# Patient Record
Sex: FEMALE | Race: WHITE | NOT HISPANIC OR LATINO | Employment: FULL TIME | URBAN - NONMETROPOLITAN AREA
[De-identification: names, ages, dates, MRNs, and addresses within clinical notes are randomized per-mention and may not be internally consistent; named-entity substitution may affect disease eponyms.]

---

## 2017-07-24 ENCOUNTER — OFFICE VISIT (OUTPATIENT)
Dept: MEDICAL GROUP | Facility: CLINIC | Age: 32
End: 2017-07-24
Payer: MEDICAID

## 2017-07-24 VITALS
HEIGHT: 62 IN | SYSTOLIC BLOOD PRESSURE: 114 MMHG | HEART RATE: 74 BPM | TEMPERATURE: 97.8 F | DIASTOLIC BLOOD PRESSURE: 70 MMHG | OXYGEN SATURATION: 99 % | BODY MASS INDEX: 21.35 KG/M2 | WEIGHT: 116 LBS

## 2017-07-24 DIAGNOSIS — Z23 NEED FOR VACCINATION: ICD-10-CM

## 2017-07-24 DIAGNOSIS — E55.9 VITAMIN D DEFICIENCY: ICD-10-CM

## 2017-07-24 DIAGNOSIS — Z83.49 FAMILY HISTORY OF HYPOTHYROIDISM: ICD-10-CM

## 2017-07-24 DIAGNOSIS — Z20.5 EXPOSURE TO HEPATITIS C: ICD-10-CM

## 2017-07-24 DIAGNOSIS — G89.29 CHRONIC BILATERAL LOW BACK PAIN WITH BILATERAL SCIATICA: ICD-10-CM

## 2017-07-24 DIAGNOSIS — M54.41 CHRONIC BILATERAL LOW BACK PAIN WITH BILATERAL SCIATICA: ICD-10-CM

## 2017-07-24 DIAGNOSIS — F31.9 BIPOLAR 1 DISORDER (HCC): ICD-10-CM

## 2017-07-24 DIAGNOSIS — M54.42 CHRONIC BILATERAL LOW BACK PAIN WITH BILATERAL SCIATICA: ICD-10-CM

## 2017-07-24 DIAGNOSIS — Z86.2 HISTORY OF POLYCYTHEMIA: ICD-10-CM

## 2017-07-24 PROCEDURE — 90715 TDAP VACCINE 7 YRS/> IM: CPT | Performed by: PHYSICIAN ASSISTANT

## 2017-07-24 PROCEDURE — 99213 OFFICE O/P EST LOW 20 MIN: CPT | Mod: 25 | Performed by: PHYSICIAN ASSISTANT

## 2017-07-24 PROCEDURE — 90471 IMMUNIZATION ADMIN: CPT | Performed by: PHYSICIAN ASSISTANT

## 2017-07-24 RX ORDER — GABAPENTIN 300 MG/1
300 CAPSULE ORAL 3 TIMES DAILY
Qty: 90 CAP | Refills: 3 | Status: SHIPPED | OUTPATIENT
Start: 2017-07-24 | End: 2019-01-29 | Stop reason: SDUPTHER
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
## 2017-07-24 NOTE — MR AVS SNAPSHOT
Conchis Winter   2017 2:40 PM   Office Visit   MRN: [id number]    [facility] Phone[facility]  [phone]    Description:  Female : 1985   Provider:  Brenda Barfield PA-C           Allergies as of 2017     No Known Allergies      You were diagnosed with     Chronic bilateral low back pain with bilateral sciatica   [0244152]       Bipolar 1 disorder (CMS-HCC)   [223151]       History of polycythemia   [201030]       Family history of hypothyroidism   [190416]       Vitamin D deficiency   [8183015]       Exposure to hepatitis C   [337146]       Need for vaccination   [720756]         Vital Signs     Smoking Status                   Former Smoker           Basic Information     Date Of Birth Sex Race Ethnicity Preferred Language    1985 Female White Non- English      Your appointments     2017  2:00 PM   ANNUAL EXAM PREVENTATIVE with Brenda Barfield PA-C   [facility] (--)    [address] [place] [id number] [phone]              Problem List              ICD-10-CM Priority Class Noted - Resolved    Chronic back pain M54.9, G89.29   3/5/2015 - Present    Anxiety F41.9   3/5/2015 - Present    Exposure to hepatitis C Z20.5   3/5/2015 - Present    Bipolar 1 disorder (CMS-HCC) F31.9   2017 - Present    History of polycythemia Z86.2   2017 - Present    Family history of hypothyroidism Z83.49   2017 - Present    Vitamin D deficiency E55.9   2017 - Present      Health Maintenance        Date Due Completion Dates    IMM DTaP/Tdap/Td Vaccine (1 - Tdap) 2004 ---    PAP SMEAR 2006 ---    IMM INFLUENZA (1) 2017 ---            Current Immunizations     Tdap Vaccine  Incomplete      Below and/or attached are the medications your provider expects you to take. Review all of your home medications and newly ordered medications with your provider and/or pharmacist. Follow medication instructions  as directed by your provider and/or pharmacist. Please keep your medication list with you and share with your provider. Update the information when medications are discontinued, doses are changed, or new medications (including over-the-counter products) are added; and carry medication information at all times in the event of emergency situations     Allergies:  No Known Allergies          Medications  Valid as of: July 24, 2017 -  3:27 PM    Generic Name Brand Name Tablet Size Instructions for use    DiazePAM (Tab) VALIUM 10 MG Take 10 mg by mouth every 6 hours as needed.        Gabapentin (Cap) NEURONTIN 300 MG Take 1 Cap by mouth 3 times a day.        Oxycodone-Acetaminophen (Tab) PERCOCET-10  MG Take 1-2 Tabs by mouth every four hours as needed.        .                 Medicines prescribed today were sent to:     [id number] [address] [address]    Phone: [phone] Fax: [phone]    Open 24 Hours?: No      Medication refill instructions:       If your prescription bottle indicates you have medication refills left, it is not necessary to call your provider’s office. Please contact your pharmacy and they will refill your medication.    If your prescription bottle indicates you do not have any refills left, you may request refills at any time through one of the following ways: The online [employer] system (except Urgent Care), by calling your provider’s office, or by asking your pharmacy to contact your provider’s office with a refill request. Medication refills are processed only during regular business hours and may not be available until the next business day. Your provider may request additional information or to have a follow-up visit with you prior to refilling your medication.   *Please Note: Medication refills are assigned a new Rx number when refilled electronically. Your pharmacy may indicate that no refills were  authorized even though a new prescription for the same medication is available at the pharmacy. Please request the medicine by name with the pharmacy before contacting your provider for a refill.        Your To Do List     Future Labs/Procedures Complete By Expires    BASIC METABOLIC PANEL  As directed 7/24/2018    CBC WITHOUT DIFFERENTIAL  As directed 1/24/2018    DX-LUMBAR SPINE-2 OR 3 VIEWS  As directed 7/24/2018    DX-LUMBAR SPINE-BEND OR FLEX-EXT  As directed 7/24/2018      Referral     A referral request has been sent to our patient care coordination department. Please allow 3-5 business days for us to process this request and contact you either by phone or mail. If you do not hear from us by the 5th business day, please call us at [phone].           Zang Access Code: [email or web address]  Expires: 8/23/2017  3:27 PM    Your email address is not on file at [facility].  Email Addresses are required for you to sign up for Zang, please contact [phone] to verify your personal information and to provide your email address prior to attempting to register for Zang.    Conchis Gomes  [address] [place] [address]    Zang  A secure, online tool to manage your health information     [facility]’s Zang® is a secure, online tool that connects you to your personalized health information from the privacy of your home -- day or night - making it very easy for you to manage your healthcare. Once the activation process is completed, you can even access your medical information using the Zang joel, which is available for free in the Apple Joel store or Google Play store.     To learn more about Zang, visit [email or web address]    There are two levels of access available (as shown below):   My Chart Features  [facility]  Specialists [facility]  Urgent  Care Non-[facility] Doctor   Email your healthcare team securely and privately 24/7 X X X    Manage  appointments: schedule your next appointment; view details of past/upcoming appointments X      Request prescription refills. X      View recent personal medical records, including lab and immunizations X X X X   View health record, including health history, allergies, medications X X X X   Read reports about your outpatient visits, procedures, consult and ER notes X X X X   See your discharge summary, which is a recap of your hospital and/or ER visit that includes your diagnosis, lab results, and care plan X X  X     How to register for Philo:  Once your e-mail address has been verified, follow the following steps to sign up for Philo.     1. Go to  [email or web address]  2. Click on the Sign Up Now box, which takes you to the New Member Sign Up page. You will need to provide the following information:  a. Enter your Philo Access Code exactly as it appears at the top of this page. (You will not need to use this code after you’ve completed the sign-up process. If you do not sign up before the expiration date, you must request a new code.)   b. Enter your date of birth.   c. Enter your home email address.   d. Click Submit, and follow the next screen’s instructions.  3. Create a Philo ID. This will be your Philo login ID and cannot be changed, so think of one that is secure and easy to remember.  4. Create a Philo password. You can change your password at any time.  5. Enter your Password Reset Question and Answer. This can be used at a later time if you forget your password.   6. Enter your e-mail address. This allows you to receive e-mail notifications when new information is available in Philo.  7. Click Sign Up. You can now view your health information.    For assistance activating your Philo account, call [phone]

## 2017-07-24 NOTE — ASSESSMENT & PLAN NOTE
Patient has tested with low vitamin D in the past and would like to retest today. She does not currently take supplements although she had done so in the past

## 2017-07-24 NOTE — ASSESSMENT & PLAN NOTE
"Patient had been told that she had excessive red blood cells at one point in time and so phlebotomy was her least expensive and most helpful option. She states that when she gave blood she felt very lethargic for approximately 3 days afterwards. She states she can \"smell the iron in her hair and mucus\"and believes her iron levels are very high at this time.  "

## 2017-07-24 NOTE — ASSESSMENT & PLAN NOTE
Patient tested negative approximately 2 years ago however she had a less than significant reading on hepatitis C so would like a retest panel because she is relatively sure she was exposed at one point in time.

## 2017-07-24 NOTE — ASSESSMENT & PLAN NOTE
Patient's mother is hypothyroid as well as her grandmother. Patient believes she may develop this at one point in time and would like to monitor this via Blood work.

## 2017-07-24 NOTE — ASSESSMENT & PLAN NOTE
Patient states she was evaluated by psychiatry and told she had bipolar disorder. She needs a follow-up visit with a new psychiatrist however in the meantime she is well controlled on over-the-counter lithium supplements at this time.

## 2017-07-27 ENCOUNTER — OFFICE VISIT (OUTPATIENT)
Dept: MEDICAL GROUP | Facility: CLINIC | Age: 32
End: 2017-07-27
Payer: MEDICAID

## 2017-07-27 ENCOUNTER — HOSPITAL ENCOUNTER (OUTPATIENT)
Facility: MEDICAL CENTER | Age: 32
End: 2017-07-27
Attending: PHYSICIAN ASSISTANT
Payer: MEDICAID

## 2017-07-27 VITALS
HEIGHT: 61 IN | WEIGHT: 118 LBS | SYSTOLIC BLOOD PRESSURE: 118 MMHG | DIASTOLIC BLOOD PRESSURE: 70 MMHG | BODY MASS INDEX: 22.28 KG/M2 | RESPIRATION RATE: 14 BRPM | TEMPERATURE: 97.5 F | HEART RATE: 78 BPM | OXYGEN SATURATION: 96 %

## 2017-07-27 DIAGNOSIS — Z01.419 WELL FEMALE EXAM WITH ROUTINE GYNECOLOGICAL EXAM: ICD-10-CM

## 2017-07-27 PROCEDURE — 99395 PREV VISIT EST AGE 18-39: CPT | Mod: EP | Performed by: PHYSICIAN ASSISTANT

## 2017-07-27 PROCEDURE — 87491 CHLMYD TRACH DNA AMP PROBE: CPT

## 2017-07-27 PROCEDURE — 87591 N.GONORRHOEAE DNA AMP PROB: CPT

## 2017-07-27 PROCEDURE — 88175 CYTOPATH C/V AUTO FLUID REDO: CPT

## 2017-07-27 PROCEDURE — 87624 HPV HI-RISK TYP POOLED RSLT: CPT

## 2017-07-27 PROCEDURE — 99000 SPECIMEN HANDLING OFFICE-LAB: CPT | Performed by: PHYSICIAN ASSISTANT

## 2017-07-27 ASSESSMENT — PATIENT HEALTH QUESTIONNAIRE - PHQ9
SUM OF ALL RESPONSES TO PHQ QUESTIONS 1-9: 20
CLINICAL INTERPRETATION OF PHQ2 SCORE: 3
5. POOR APPETITE OR OVEREATING: 3 - NEARLY EVERY DAY

## 2017-07-27 NOTE — PROGRESS NOTES
SUBJECTIVE: 31 y.o. female for routine pap and checkup.  Chief Complaint   Patient presents with   • Gynecologic Exam         Last Pap:   Contraception: IUD, paragard  H/O Abnormal Pap yes, positive HPV  H/O STI yes, chlamydia, HPV   Current partner: male mongomous     LMP: 2017    Allergies: Review of patient's allergies indicates no known allergies.     ROS:  Menses every month with no cramping  Bleeding is moderate.    excedrin and pamprin analgesics required during menses.  No menstrual depression, labile mood, anxiety, bloating/fluid retention, insomnia. Positive for migraine headaches, libido changes   no menopause symptoms of hot flashes, night sweats, sleep disruption, mood changes, vaginal dryness.   No pelvic pain, or dyspareunia. No unusual vaginal discharge   No breast tenderness, mass, nipple discharge, changes in size or contour, or abnormal cyclic discomfort.  No urinary tract symptoms, no incontinence.   No abdominal pain, change in bowel habits, black or bloody stools.    No unusual headaches, no visual changes.   No prolonged cough. No dyspnea or chest pain on exertion.    No skin lesions, concerns.     Preventive Care:  Mammogram not due.    DEXA not due  Colonoscopy not due  HPV vaccine didn't receive and not eligible.     Current Outpatient Prescriptions   Medication Sig Dispense Refill   • gabapentin (NEURONTIN) 300 MG Cap Take 1 Cap by mouth 3 times a day. 90 Cap 3   • diazepam (VALIUM) 10 MG tablet Take 10 mg by mouth every 6 hours as needed.     • Oxycodone-Acetaminophen (PERCOCET-10)  MG TABS Take 1-2 Tabs by mouth every four hours as needed.       No current facility-administered medications for this visit.     She  has a past medical history of Chronic back pain (3/5/2015); Anxiety (3/5/2015); and Hyperlipidemia.  She  has no past surgical history on file.     Family History:   Family History   Problem Relation Age of Onset   • Heart Attack Paternal Grandfather    •  "Heart Attack Paternal Grandmother    • Thyroid Mother    • Diabetes Paternal Grandmother        Family History negative for : Breast, Colon, Lung, or female organ cancer,  osteoporosis.     OBJECTIVE:   /70 mmHg  Pulse 78  Temp(Src) 36.4 °C (97.5 °F)  Resp 14  Ht 1.556 m (5' 1.25\")  Wt 53.524 kg (118 lb)  BMI 22.11 kg/m2  SpO2 96%  Body mass index is 22.11 kg/(m^2).      HEAD AND NECK:  Ears normal.  Throat, oral cavity and tongue normal.  Neck supple. No adenopathy or masses in the neck or supraclavicular regions.  No carotid bruits. No thyromegaly.   NEURO: Cranial nerves are normal. DTR's normal and symmetric.    CHEST:  Clear, good air entry, no wheezes or rales.   HEART:  Regular rate and rhythm.  S1 and S2 normal.  No edema or JVD. ABDOMEN:  Soft without tenderness, guarding, mass or organomegaly.  No CVA tenderness or inguinal adenopathy.   EXTREMITIES:  Extremities, reflexes and peripheral pulses are normal.    SKIN:  No rashes or suspicious skin lesions noted.     Breast Exam: Performed with instruction during examination. No axillary lymphadenopathy. No fixed or dominant masses. No nipple retraction or skin abnormalities.    PELVIC EXAMINATION    Chaperone Cori Welsh MA    Labia: normal, no lesions or erythema noted   Urethral Orifice: normal  Vagina: vaginal canal clear, no lesions  Cervix: No polyps, masses or lesions noted. Normal, nonfoul discharge noted.     BIMANUAL EXAM   Vaginal Walls: normal  Cervix: No CMT  Uterus: normal  Adnexa: normal   Rectal: not performed      <ASSESSMENT>  1. Well female exam with routine gynecological exam  THINPREP PAP W/HPV AND CTNG       Discussed breast self exam, STD prevention, HIV risk factors and prevention, use and side effects of OCPs, use and side effects of HRT, family planning choices and adequate intake of calcium and vitamin D   Follow-up in 1 years for next Gyn exam and Pap if HPV is still positive.             "

## 2017-07-27 NOTE — MR AVS SNAPSHOT
"Conchis Winter   2017 8:00 AM   Office Visit   MRN: [id number]    [facility] Phone[facility]  [phone]    Description:  Female : 1985   Provider:  Brenda Barfield PA-C           Reason for Visit     Gynecologic Exam           Allergies as of 2017     No Known Allergies      You were diagnosed with     Well female exam with routine gynecological exam   [[id number]]         Vital Signs     Blood Pressure Pulse Temperature Respirations Height Weight    118/70 mmHg 78 36.4 °C (97.5 °F) 14 1.556 m (5' 1.25\") 53.524 kg (118 lb)    Body Mass Index Oxygen Saturation Smoking Status             22.11 kg/m2 96% Former Smoker         Basic Information     Date Of Birth Sex Race Ethnicity Preferred Language    1985 Female White Non- English      Your appointments     Aug 02, 2017 10:15 AM   New Patient with Maxx Shoemaker M.D.   [facility] PHYSIAT[facility] (--)    [address]  [facility] [id number] [phone]           Please bring Photo ID, Insurance Cards, All Medication Bottles and copies of any legal documents (such as Living Will, Power of ) If speaking a language besides English please bring an adult . Please arrive 30 minutes prior for check in and registration. You will be receiving a confirmation call a few days before your appointment from our automated call confirmation system.              Problem List              ICD-10-CM Priority Class Noted - Resolved    Chronic back pain M54.9, G89.29   3/5/2015 - Present    Anxiety F41.9   3/5/2015 - Present    Exposure to hepatitis C Z20.5   3/5/2015 - Present    Bipolar 1 disorder (CMS-HCC) F31.9   2017 - Present    History of polycythemia Z86.2   2017 - Present    Family history of hypothyroidism Z83.49   2017 - Present    Vitamin D deficiency E55.9   2017 - Present      Health Maintenance        Date Due Completion Dates    PAP SMEAR 2006 ---    IMM " INFLUENZA (1) 9/1/2017 ---    IMM DTaP/Tdap/Td Vaccine (2 - Td) 7/24/2027 7/24/2017            Current Immunizations     Tdap Vaccine 7/24/2017      Below and/or attached are the medications your provider expects you to take. Review all of your home medications and newly ordered medications with your provider and/or pharmacist. Follow medication instructions as directed by your provider and/or pharmacist. Please keep your medication list with you and share with your provider. Update the information when medications are discontinued, doses are changed, or new medications (including over-the-counter products) are added; and carry medication information at all times in the event of emergency situations     Allergies:  No Known Allergies          Medications  Valid as of: July 27, 2017 -  4:24 PM    Generic Name Brand Name Tablet Size Instructions for use    DiazePAM (Tab) VALIUM 10 MG Take 10 mg by mouth every 6 hours as needed.        Gabapentin (Cap) NEURONTIN 300 MG Take 1 Cap by mouth 3 times a day.        Oxycodone-Acetaminophen (Tab) PERCOCET-10  MG Take 1-2 Tabs by mouth every four hours as needed.        .                 Medicines prescribed today were sent to:     [facility] [address]    Phone: [phone] Fax: [phone]    Open 24 Hours?: No      Medication refill instructions:       If your prescription bottle indicates you have medication refills left, it is not necessary to call your provider’s office. Please contact your pharmacy and they will refill your medication.    If your prescription bottle indicates you do not have any refills left, you may request refills at any time through one of the following ways: The online [employer] system (except Urgent Care), by calling your provider’s office, or by asking your pharmacy to contact your provider’s office with a refill request. Medication refills are  processed only during regular business hours and may not be available until the next business day. Your provider may request additional information or to have a follow-up visit with you prior to refilling your medication.   *Please Note: Medication refills are assigned a new Rx number when refilled electronically. Your pharmacy may indicate that no refills were authorized even though a new prescription for the same medication is available at the pharmacy. Please request the medicine by name with the pharmacy before contacting your provider for a refill.        Your To Do List     Future Labs/Procedures Complete By Expires    THINPREP PAP W/HPV AND CTNG  As directed 7/27/2018         Recommendi Access Code: [email or web address]  Expires: 8/23/2017  3:27 PM    Your email address is not on file at [facility].  Email Addresses are required for you to sign up for Recommendi, please contact [phone] to verify your personal information and to provide your email address prior to attempting to register for Recommendi.    Conchis Gomes  [address] [place] [id number]    Recommendi  A secure, online tool to manage your health information     [facility]’s Recommendi® is a secure, online tool that connects you to your personalized health information from the privacy of your home -- day or night - making it very easy for you to manage your healthcare. Once the activation process is completed, you can even access your medical information using the Recommendi joel, which is available for free in the Apple Joel store or Google Play store.     To learn more about Recommendi, visit [email or web address]    There are two levels of access available (as shown below):   My Chart Features  [facility] Primary Care Doctor [facility]  Specialists [facility]  [facility]   Email your healthcare team securely and privately 24/7 X X X    Manage appointments: schedule your next appointment; view details of past/upcoming appointments  X      Request prescription refills. X      View recent personal medical records, including lab and immunizations X X X X   View health record, including health history, allergies, medications X X X X   Read reports about your outpatient visits, procedures, consult and ER notes X X X X   See your discharge summary, which is a recap of your hospital and/or ER visit that includes your diagnosis, lab results, and care plan X X  X     How to register for Publicfast:  Once your e-mail address has been verified, follow the following steps to sign up for Publicfast.     1. Go to  [email or web address]  2. Click on the Sign Up Now box, which takes you to the New Member Sign Up page. You will need to provide the following information:  a. Enter your Publicfast Access Code exactly as it appears at the top of this page. (You will not need to use this code after you’ve completed the sign-up process. If you do not sign up before the expiration date, you must request a new code.)   b. Enter your date of birth.   c. Enter your home email address.   d. Click Submit, and follow the next screen’s instructions.  3. Create a Publicfast ID. This will be your Publicfast login ID and cannot be changed, so think of one that is secure and easy to remember.  4. Create a Publicfast password. You can change your password at any time.  5. Enter your Password Reset Question and Answer. This can be used at a later time if you forget your password.   6. Enter your e-mail address. This allows you to receive e-mail notifications when new information is available in Publicfast.  7. Click Sign Up. You can now view your health information.    For assistance activating your Publicfast account, call [phone]

## 2017-07-29 LAB
C TRACH DNA GENITAL QL NAA+PROBE: NEGATIVE
CYTOLOGY REG CYTOL: NORMAL
HPV HR 12 DNA CVX QL NAA+PROBE: NEGATIVE
HPV16 DNA SPEC QL NAA+PROBE: NEGATIVE
HPV18 DNA SPEC QL NAA+PROBE: NEGATIVE
N GONORRHOEA DNA GENITAL QL NAA+PROBE: NEGATIVE
SPECIMEN SOURCE: NORMAL
SPECIMEN SOURCE: NORMAL

## 2017-08-02 ENCOUNTER — OFFICE VISIT (OUTPATIENT)
Dept: PHYSICAL MEDICINE AND REHAB | Facility: MEDICAL CENTER | Age: 32
End: 2017-08-02
Payer: MEDICAID

## 2017-08-02 VITALS
OXYGEN SATURATION: 96 % | SYSTOLIC BLOOD PRESSURE: 94 MMHG | HEART RATE: 77 BPM | WEIGHT: 120 LBS | HEIGHT: 62 IN | DIASTOLIC BLOOD PRESSURE: 64 MMHG | BODY MASS INDEX: 22.08 KG/M2 | TEMPERATURE: 98.2 F

## 2017-08-02 DIAGNOSIS — M25.559 ARTHRALGIA OF HIP, UNSPECIFIED LATERALITY: ICD-10-CM

## 2017-08-02 DIAGNOSIS — M54.2 NECK PAIN: ICD-10-CM

## 2017-08-02 DIAGNOSIS — M54.41 CHRONIC BILATERAL LOW BACK PAIN WITH BILATERAL SCIATICA: ICD-10-CM

## 2017-08-02 DIAGNOSIS — M54.42 CHRONIC BILATERAL LOW BACK PAIN WITH BILATERAL SCIATICA: ICD-10-CM

## 2017-08-02 DIAGNOSIS — G89.29 CHRONIC BILATERAL LOW BACK PAIN WITH BILATERAL SCIATICA: ICD-10-CM

## 2017-08-02 DIAGNOSIS — G56.03 BILATERAL CARPAL TUNNEL SYNDROME: ICD-10-CM

## 2017-08-02 DIAGNOSIS — M54.50 LUMBOSACRAL PAIN: ICD-10-CM

## 2017-08-02 DIAGNOSIS — M54.16 LUMBAR RADICULITIS: ICD-10-CM

## 2017-08-02 PROCEDURE — 99204 OFFICE O/P NEW MOD 45 MIN: CPT | Performed by: PHYSICAL MEDICINE & REHABILITATION

## 2017-08-02 ASSESSMENT — ENCOUNTER SYMPTOMS
SENSORY CHANGE: 1
MYALGIAS: 1
NAUSEA: 0
TINGLING: 1
DOUBLE VISION: 0
PALPITATIONS: 0
VOMITING: 0
NECK PAIN: 1
ORTHOPNEA: 0
PHOTOPHOBIA: 0
BACK PAIN: 1
FEVER: 0
HEMOPTYSIS: 0
SPUTUM PRODUCTION: 0
CHILLS: 0

## 2017-08-02 NOTE — PROGRESS NOTES
Subjective:      Conchis Gomes is a 31 y.o. female who presents with New Patient      Chief complaint: Low back pain        HPI   The patient notes long history of spinal/joints/musculoskeletal pain, denies specific trauma at onset, although notes began approximately 6 months after her child was born in 2009.    She notes ongoing pain in the lumbosacral region, also bilateral lower limb radiating pain with neuropathic component.    The patient notes bilateral hip pain.    The patient notes neck pain, without overt radicular component.    The patient notes wrist/hand pain, also carpal tunnel symptoms.    The patient notes prior trial with medications. She has been to physical therapy. She notes seen Dr. Haque in the past, tried injection, without benefit, and records not available for review. No bowel/bladder dysfunction noted. No overt limb weakness noted. She is making an effort with home exercise program. The ongoing pain limits her ability to function. She is inquiring about additional treatment options.      MEDICAL RECORDS REVIEW/DATA REVIEW: Reviewed in epic.    Records Reviewed: Reviewed referring provider notes.     I reviewed medications.     I reviewed  profile 8/2/2017.    I reviewed diagnostic studies:     I reviewed radiographs. No recent spine radiographs available for review.    I reviewed lab studies. No recent laboratory studies available for review.     I reviewed medical issues.     I reviewed family history: No neuromuscular disorders noted.    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      PAST MEDICAL HISTORY:   Past Medical History   Diagnosis Date   • Chronic back pain 3/5/2015   • Anxiety 3/5/2015   • Hyperlipidemia      no medication       PAST SURGICAL HISTORY:  History reviewed. No pertinent past surgical history.    ALLERGIES:  Review of patient's allergies indicates no known allergies.    MEDICATIONS:    Outpatient Encounter Prescriptions as of 8/2/2017   Medication Sig Dispense  Refill   • gabapentin (NEURONTIN) 300 MG Cap Take 1 Cap by mouth 3 times a day. 90 Cap 3   • [DISCONTINUED] diazepam (VALIUM) 10 MG tablet Take 10 mg by mouth every 6 hours as needed.     • [DISCONTINUED] Oxycodone-Acetaminophen (PERCOCET-10)  MG TABS Take 1-2 Tabs by mouth every four hours as needed.       No facility-administered encounter medications on file as of 8/2/2017.       SOCIAL HISTORY:    Social History     Social History   • Marital Status: Single     Spouse Name: N/A   • Number of Children: N/A   • Years of Education: N/A     Social History Main Topics   • Smoking status: Former Smoker -- 0.50 packs/day for .5 years     Quit date: 08/27/2016   • Smokeless tobacco: Former User     Quit date: 08/27/2016   • Alcohol Use: No   • Drug Use: Yes     Special: Marijuana      Comment: edibles, sometimes smoke    • Sexual Activity: No     Other Topics Concern   •  Service No   • Blood Transfusions No   • Caffeine Concern No   • Occupational Exposure No   • Hobby Hazards No   • Sleep Concern No   • Stress Concern Yes   • Weight Concern No   • Special Diet No   • Back Care Yes   • Exercise Yes   • Bike Helmet Yes   • Seat Belt Yes   • Self-Exams Yes     Social History Narrative         Review of Systems   Constitutional: Negative for fever and chills.   HENT: Negative for hearing loss and tinnitus.    Eyes: Negative for double vision and photophobia.   Respiratory: Negative for hemoptysis and sputum production.    Cardiovascular: Negative for palpitations and orthopnea.   Gastrointestinal: Negative for nausea and vomiting.   Genitourinary: Negative for urgency and frequency.   Musculoskeletal: Positive for myalgias, back pain, joint pain and neck pain.   Skin: Negative.    Neurological: Positive for tingling and sensory change.   Endo/Heme/Allergies: Negative.    All other systems reviewed and are negative.        Objective:     BP 94/64 mmHg  Pulse 77  Temp(Src) 36.8 °C (98.2 °F)  Ht 1.575 m (5'  "2\")  Wt 54.432 kg (120 lb)  BMI 21.94 kg/m2  SpO2 96%  LMP 07/07/2017     Physical Exam  Constitutional: oriented to person, place, and time, appears well-developed and well-nourished.   HEENT: Normocephalic atraumatic, neck supple, no JVD noted, no masses noted, no meningeal signs noted  Lymphadenopathy: no cervical, supraclavicular, or inguinal lymphadenopathy noted  Cardiovascular: Intact distal pulses, including at wrists and ankles, no limb swelling noted  Pulmonary: No tachypnea noted, no accessory muscle use noted, no dyspnea noted  Abdominal: Soft, nontender, exhibits no distension, no peritoneal signs, no HSM  Musculoskeletal:   Right shoulder: exhibits mild tenderness. Minimal pain with range of motion testing  Left shoulder: exhibits mild tenderness. Minimal pain with range of motion testing  Right hip: exhibits mild tenderness. Minimal pain with range of motion testing  Left hip: exhibits mild tenderness. Minimal pain with range of motion testing  Cervical back: exhibits mild decreased range of motion, mild tenderness and mild pain. Spurling's testing produces mild axial pain, trigger points noted  Lumbar back: exhibits decreased range of motion, tenderness and mild pain. straight leg testing produces posterior pelvic pain, trigger points noted  Wrist/hand: mild pain with range of motion testing, equivocal tinel's at wrist, negative tinel's at elbows  Neurological: oriented to person, place, and time. Cranial nerves grossly intact, normal strength. Sensation intact distally. Reflexes 1+ in upper and lower limbs, Gait mildly antalgic, reciprocal, Able to heel/toe walk, No upper motor neuron signs evident  Skin: Skin is intact. no rashes or lesions noted  Psychiatric: normal mood and affect. speech is normal and behavior is normal. Judgment and thought content normal. Cognition and memory are normal.        Assessment/Plan:       ASSESSMENT:    1. Lumbosacral pain, myofascial pain, lumbar radiculitis, " concern for stenosis    - obtain lumbar spine x-rays, ordered by PCP  - MR-LUMBAR SPINE-W/O; Future    2. Bilateral hip pain, sprain strain     - DX-HIP-BILATERAL-WITH PELVIS-2 VIEWS; Future    3. Neck pain, myofascial pain    - DX-CERVICAL SPINE-4+ VIEWS; Future    4. Bilateral wrist/hand pain, sprain strain, carpal tunnel symptoms    - WRIST SPLINTS, as trial  - reviewed ergonomic modifications    5. Medication monitoring    - REFERRAL TO PAIN CLINIC, as patient advised at this facility complies with the federal illegal substance last  - The patient was advised that this facility is available for further services that do not involve controlled substances    6. Co-morbid medical issues with care per primary care provider    - Obtain laboratory studies, ordered by primary care provider      DISCUSSION/PLAN:    - I discussed management options. I reviewed symptomatic care    - I would like to obtain/review additional records    - I reviewed home exercise program, with medical precautions    - The patient can consider complementary trials with     - I reviewed medication monitoring.  I reviewed further symptomatic medications. I did not prescribe any medication today    - I reviewed additional diagnostic options, including further/advanced imaging, electrodiagnostic testing, vascular studies, and further lab screen    - I reviewed additional therapeutic options, including injection/interventional therapy and additional consultative input    - I reviewed psychosocial interventions    - Return after the above noted diagnostic studies or an as-needed basis      Please note that this dictation was created using voice recognition software. I have made every reasonable attempt to correct obvious errors but there may be errors of grammar and content that I may have overlooked prior to finalization of this note.

## 2017-08-04 ENCOUNTER — HOSPITAL ENCOUNTER (OUTPATIENT)
Dept: RADIOLOGY | Facility: MEDICAL CENTER | Age: 32
End: 2017-08-04

## 2017-08-07 ENCOUNTER — TELEPHONE (OUTPATIENT)
Dept: MEDICAL GROUP | Facility: CLINIC | Age: 32
End: 2017-08-07

## 2017-08-07 NOTE — TELEPHONE ENCOUNTER
1. Caller Name: Conchis                      Call Back Number: [phone] (home)       2. Message: Conchis called and stated the the referral you put in a while ago was just to a therapist and she needs someone who can write her a prescription and see her for her bi-polar    3. Patient approves office to leave a detailed voicemail/MyChart message: N\A

## 2017-08-09 NOTE — TELEPHONE ENCOUNTER
This is incorrect. She was referred to psychiatry. Psychiatry prescribes medication. She should call the [facility] referral department to follow up with this or, if she know who she wants to see she can come in to have a printout of her psychiatry referral.       Patient's phone is not working. Spoke with father and told him to have Conchis call the [place] office when she could.

## 2017-09-01 ENCOUNTER — NON-PROVIDER VISIT (OUTPATIENT)
Dept: MEDICAL GROUP | Facility: CLINIC | Age: 32
End: 2017-09-01
Payer: MEDICAID

## 2017-09-01 ENCOUNTER — HOSPITAL ENCOUNTER (OUTPATIENT)
Facility: MEDICAL CENTER | Age: 32
End: 2017-09-01
Attending: PHYSICIAN ASSISTANT
Payer: MEDICAID

## 2017-09-01 DIAGNOSIS — M54.41 CHRONIC BILATERAL LOW BACK PAIN WITH BILATERAL SCIATICA: ICD-10-CM

## 2017-09-01 DIAGNOSIS — Z01.89 ROUTINE LAB DRAW: ICD-10-CM

## 2017-09-01 DIAGNOSIS — M54.42 CHRONIC BILATERAL LOW BACK PAIN WITH BILATERAL SCIATICA: ICD-10-CM

## 2017-09-01 DIAGNOSIS — E55.9 VITAMIN D DEFICIENCY: ICD-10-CM

## 2017-09-01 DIAGNOSIS — Z86.2 HISTORY OF POLYCYTHEMIA: ICD-10-CM

## 2017-09-01 DIAGNOSIS — F31.9 BIPOLAR 1 DISORDER (HCC): ICD-10-CM

## 2017-09-01 DIAGNOSIS — G89.29 CHRONIC BILATERAL LOW BACK PAIN WITH BILATERAL SCIATICA: ICD-10-CM

## 2017-09-01 LAB
25(OH)D3 SERPL-MCNC: 21 NG/ML (ref 30–100)
ANION GAP SERPL CALC-SCNC: 7 MMOL/L (ref 0–11.9)
BUN SERPL-MCNC: 16 MG/DL (ref 8–22)
CALCIUM SERPL-MCNC: 9.9 MG/DL (ref 8.5–10.5)
CHLORIDE SERPL-SCNC: 104 MMOL/L (ref 96–112)
CO2 SERPL-SCNC: 28 MMOL/L (ref 20–33)
CREAT SERPL-MCNC: 0.7 MG/DL (ref 0.5–1.4)
ERYTHROCYTE [DISTWIDTH] IN BLOOD BY AUTOMATED COUNT: 39 FL (ref 35.9–50)
GFR SERPL CREATININE-BSD FRML MDRD: >60 ML/MIN/1.73 M 2
GLUCOSE SERPL-MCNC: 87 MG/DL (ref 65–99)
HAV IGM SERPL QL IA: NEGATIVE
HBV CORE IGM SER QL: NEGATIVE
HBV SURFACE AG SER QL: NEGATIVE
HCT VFR BLD AUTO: 43 % (ref 37–47)
HCV AB SER QL: NEGATIVE
HGB BLD-MCNC: 14.6 G/DL (ref 12–16)
MCH RBC QN AUTO: 29.7 PG (ref 27–33)
MCHC RBC AUTO-ENTMCNC: 34 G/DL (ref 33.6–35)
MCV RBC AUTO: 87.6 FL (ref 81.4–97.8)
PLATELET # BLD AUTO: 211 K/UL (ref 164–446)
PMV BLD AUTO: 12.3 FL (ref 9–12.9)
POTASSIUM SERPL-SCNC: 4 MMOL/L (ref 3.6–5.5)
RBC # BLD AUTO: 4.91 M/UL (ref 4.2–5.4)
SODIUM SERPL-SCNC: 139 MMOL/L (ref 135–145)
T4 FREE SERPL-MCNC: 0.61 NG/DL (ref 0.53–1.43)
TSH SERPL DL<=0.005 MIU/L-ACNC: 1.53 UIU/ML (ref 0.3–3.7)
WBC # BLD AUTO: 8.5 K/UL (ref 4.8–10.8)

## 2017-09-01 PROCEDURE — 85027 COMPLETE CBC AUTOMATED: CPT

## 2017-09-01 PROCEDURE — 80048 BASIC METABOLIC PNL TOTAL CA: CPT

## 2017-09-01 PROCEDURE — 82306 VITAMIN D 25 HYDROXY: CPT

## 2017-09-01 PROCEDURE — 99000 SPECIMEN HANDLING OFFICE-LAB: CPT | Performed by: NURSE PRACTITIONER

## 2017-09-01 PROCEDURE — 82652 VIT D 1 25-DIHYDROXY: CPT

## 2017-09-01 PROCEDURE — 80074 ACUTE HEPATITIS PANEL: CPT

## 2017-09-01 PROCEDURE — 36415 COLL VENOUS BLD VENIPUNCTURE: CPT | Performed by: NURSE PRACTITIONER

## 2017-09-01 PROCEDURE — 84439 ASSAY OF FREE THYROXINE: CPT

## 2017-09-01 PROCEDURE — 84443 ASSAY THYROID STIM HORMONE: CPT

## 2017-09-03 LAB — 1,25(OH)2D3 SERPL-MCNC: 45.2 PG/ML (ref 19.9–79.3)

## 2017-09-13 ENCOUNTER — TELEPHONE (OUTPATIENT)
Dept: MEDICAL GROUP | Facility: CLINIC | Age: 32
End: 2017-09-13

## 2017-09-13 NOTE — TELEPHONE ENCOUNTER
----- Message from Brenda Barfield P.A.-C. sent at 9/5/2017  1:11 PM PDT -----  I reviewed labs. Everything is within normal limits and looks good except low vitamin D levels. Please take 4000 IU of Vitamin D every day and retest in 3-6 months.

## 2018-11-21 NOTE — MR AVS SNAPSHOT
"        Conchis Winter   2017 10:15 AM   Office Visit   MRN: [id number]    Department:  [facility]   Dept Phone:  [phone]    Description:  Female : 1985   Provider:  Maxx Shoemaker M.D.           Reason for Visit     New Patient           Allergies as of 2017     No Known Allergies      You were diagnosed with     Chronic bilateral low back pain with bilateral sciatica   [0856274]       Bilateral carpal tunnel syndrome   [635160]       Lumbosacral pain   [244857]       Lumbar radiculitis   [379195]       Neck pain   [924302]       Arthralgia of hip, unspecified laterality   [955641]         Vital Signs     Blood Pressure Pulse Temperature Height Weight Body Mass Index    94/64 mmHg 77 36.8 °C (98.2 °F) 1.575 m (5' 2\") 54.432 kg (120 lb) 21.94 kg/m2    Oxygen Saturation Last Menstrual Period Smoking Status             96% 2017 Former Smoker         Basic Information     Date Of Birth Sex Race Ethnicity Preferred Language    1985 Female White Non- English      Problem List              ICD-10-CM Priority Class Noted - Resolved    Chronic back pain M54.9, G89.29   3/5/2015 - Present    Anxiety F41.9   3/5/2015 - Present    Exposure to hepatitis C Z20.5   3/5/2015 - Present    Bipolar 1 disorder (CMS-HCC) F31.9   2017 - Present    History of polycythemia Z86.2   2017 - Present    Family history of hypothyroidism Z83.49   2017 - Present    Vitamin D deficiency E55.9   2017 - Present      Health Maintenance        Date Due Completion Dates    IMM INFLUENZA (1) 2017 ---    PAP SMEAR 2020, 2017    IMM DTaP/Tdap/Td Vaccine (2 - Td) 2027            Current Immunizations     Tdap Vaccine 2017      Below and/or attached are the medications your provider expects you to take. Review all of your home medications and newly ordered medications with your provider and/or pharmacist. Follow medication instructions as directed " Verified Results  XR SPINE LUMBAR 3V 11Jan2018 12:16PM ELIDA JONES     Test Name Result Flag Reference   XR SPINE LUMBAR 3V (Report)     Accession #    [id number]    EXAM: XR SPINE LUMBAR 3V    CLINICAL INDICATION: Sciatica on the right side.    COMPARISON: None    FINDINGS: Vertebral body heights and disc interspace heights are normal except for moderate   narrowing at L5-S1 . Mild degenerative change with mild intervertebral disc space narrowing   lower thoracic spine identified. Anterior osteophytes off the superior aspect of the lower four   lumbar vertebral bodies.    Vascular calcification in a nonaneurysmal pattern, cholecystectomy clips and splenic artery   tortuosity with calcification is seen.    IMPRESSION:  1. Chronic degenerative changes with narrowing most prominent is moderate at L5-S1.    **** F I N A L ****    Transcribed By: JAMES   01/11/18 3:24 pm    Dictated By:      CRISTAL, MARSHALL SALTER    Electronically Reviewed and Approved By:      MARSHALL BEE MD 01/11/18 3:26 pm       Message   xray shos arthritis of low back      by your provider and/or pharmacist. Please keep your medication list with you and share with your provider. Update the information when medications are discontinued, doses are changed, or new medications (including over-the-counter products) are added; and carry medication information at all times in the event of emergency situations     Allergies:  No Known Allergies          Medications  Valid as of: August 02, 2017 - 10:55 AM    Generic Name Brand Name Tablet Size Instructions for use    Gabapentin (Cap) NEURONTIN 300 MG Take 1 Cap by mouth 3 times a day.        .                 Medicines prescribed today were sent to:     [id number] - [place], [place] - [address] [place] [address]    Phone: [phone] Fax: [phone]    Open 24 Hours?: No      Medication refill instructions:       If your prescription bottle indicates you have medication refills left, it is not necessary to call your provider’s office. Please contact your pharmacy and they will refill your medication.    If your prescription bottle indicates you do not have any refills left, you may request refills at any time through one of the following ways: The online [employer] system (except Urgent Care), by calling your provider’s office, or by asking your pharmacy to contact your provider’s office with a refill request. Medication refills are processed only during regular business hours and may not be available until the next business day. Your provider may request additional information or to have a follow-up visit with you prior to refilling your medication.   *Please Note: Medication refills are assigned a new Rx number when refilled electronically. Your pharmacy may indicate that no refills were authorized even though a new prescription for the same medication is available at the pharmacy. Please request the medicine by name with the pharmacy before contacting your provider for a refill.           Your To Do List     Future Labs/Procedures Complete By Expires    DX-CERVICAL SPINE-4+ VIEWS  As directed 8/2/2018    DX-HIP-BILATERAL-WITH PELVIS-2 VIEWS  As directed 8/2/2018    MR-LUMBAR SPINE-W/O  As directed 8/2/2018      Referral     A referral request has been sent to our patient care coordination department. Please allow 3-5 business days for us to process this request and contact you either by phone or mail. If you do not hear from us by the 5th business day, please call us at [phone].           [employer] Access Code: [email or web address]  Expires: 8/23/2017  3:27 PM    Your email address is not on file at [facility].  Email Addresses are required for you to sign up for [employer], please contact [phone] to verify your personal information and to provide your email address prior to attempting to register for [employer].    Conchis Gomes  [facility], [place], [place] [address]    [employer]  A secure, online tool to manage your health information     [facility]’s [employer]® is a secure, online tool that connects you to your personalized health information from the privacy of your home -- day or night - making it very easy for you to manage your healthcare. Once the activation process is completed, you can even access your medical information using the [employer] joel, which is available for free in the Apple Joel store or Google Play store.     To learn more about [employer], visit [email or web address]    There are two levels of access available (as shown below):   My Chart Features  [facility] Primary Care Doctor [facility]  Specialists [facility]  Urgent  Care Non-[facility] Primary Care Doctor   Email your healthcare team securely and privately 24/7 X X X    Manage appointments: schedule your next appointment; view details of past/upcoming appointments X      Request prescription refills. X      View recent personal medical records, including lab and immunizations X X X X   View health record, including health history,  allergies, medications X X X X   Read reports about your outpatient visits, procedures, consult and ER notes X X X X   See your discharge summary, which is a recap of your hospital and/or ER visit that includes your diagnosis, lab results, and care plan X X  X     How to register for [employer]:  Once your e-mail address has been verified, follow the following steps to sign up for [employer].     1. Go to  [email or web address]  2. Click on the Sign Up Now box, which takes you to the New Member Sign Up page. You will need to provide the following information:  a. Enter your [employer] Access Code exactly as it appears at the top of this page. (You will not need to use this code after you’ve completed the sign-up process. If you do not sign up before the expiration date, you must request a new code.)   b. Enter your date of birth.   c. Enter your home email address.   d. Click Submit, and follow the next screen’s instructions.  3. Create a [employer] ID. This will be your [employer] login ID and cannot be changed, so think of one that is secure and easy to remember.  4. Create a [employer] password. You can change your password at any time.  5. Enter your Password Reset Question and Answer. This can be used at a later time if you forget your password.   6. Enter your e-mail address. This allows you to receive e-mail notifications when new information is available in [employer].  7. Click Sign Up. You can now view your health information.    For assistance activating your [employer] account, call [phone]

## 2019-01-29 ENCOUNTER — OFFICE VISIT (OUTPATIENT)
Dept: MEDICAL GROUP | Facility: CLINIC | Age: 34
End: 2019-01-29
Payer: MEDICAID

## 2019-01-29 ENCOUNTER — HOSPITAL ENCOUNTER (OUTPATIENT)
Facility: MEDICAL CENTER | Age: 34
End: 2019-01-29
Attending: PHYSICIAN ASSISTANT
Payer: MEDICAID

## 2019-01-29 VITALS
OXYGEN SATURATION: 100 % | DIASTOLIC BLOOD PRESSURE: 76 MMHG | TEMPERATURE: 98.4 F | HEART RATE: 83 BPM | SYSTOLIC BLOOD PRESSURE: 132 MMHG | WEIGHT: 121 LBS | BODY MASS INDEX: 22.26 KG/M2 | HEIGHT: 62 IN | RESPIRATION RATE: 16 BRPM

## 2019-01-29 DIAGNOSIS — M54.42 CHRONIC BILATERAL LOW BACK PAIN WITH BILATERAL SCIATICA: ICD-10-CM

## 2019-01-29 DIAGNOSIS — N12 PYELONEPHRITIS: ICD-10-CM

## 2019-01-29 DIAGNOSIS — M54.41 CHRONIC BILATERAL LOW BACK PAIN WITH BILATERAL SCIATICA: ICD-10-CM

## 2019-01-29 DIAGNOSIS — F41.9 ANXIETY: ICD-10-CM

## 2019-01-29 DIAGNOSIS — G89.29 CHRONIC BILATERAL LOW BACK PAIN WITH BILATERAL SCIATICA: ICD-10-CM

## 2019-01-29 PROCEDURE — 87086 URINE CULTURE/COLONY COUNT: CPT

## 2019-01-29 PROCEDURE — 99214 OFFICE O/P EST MOD 30 MIN: CPT | Performed by: PHYSICIAN ASSISTANT

## 2019-01-29 RX ORDER — SERTRALINE HYDROCHLORIDE 25 MG/1
25 TABLET, FILM COATED ORAL DAILY
Qty: 45 TAB | Refills: 1 | Status: SHIPPED | OUTPATIENT
Start: 2019-01-29 | End: 2019-03-12

## 2019-01-29 RX ORDER — SULFAMETHOXAZOLE AND TRIMETHOPRIM 800; 160 MG/1; MG/1
1 TABLET ORAL EVERY 12 HOURS
Qty: 28 TAB | Refills: 0 | Status: SHIPPED | OUTPATIENT
Start: 2019-01-29 | End: 2019-02-12

## 2019-01-29 RX ORDER — GABAPENTIN 300 MG/1
300 CAPSULE ORAL 3 TIMES DAILY
Qty: 90 CAP | Refills: 3 | Status: SHIPPED | OUTPATIENT
Start: 2019-01-29 | End: 2019-06-05 | Stop reason: SDUPTHER

## 2019-01-29 RX ORDER — PHENAZOPYRIDINE HYDROCHLORIDE 200 MG/1
200 TABLET, FILM COATED ORAL 3 TIMES DAILY
Qty: 6 TAB | Refills: 0 | Status: SHIPPED | OUTPATIENT
Start: 2019-01-29 | End: 2019-01-31

## 2019-01-29 NOTE — ASSESSMENT & PLAN NOTE
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

## 2019-01-29 NOTE — PATIENT INSTRUCTIONS
Pyelonephritis, Adult  Introduction  Pyelonephritis is a kidney infection. The kidneys are organs that help clean your blood by moving waste out of your blood and into your pee (urine). This infection can happen quickly, or it can last for a long time. In most cases, it clears up with treatment and does not cause other problems.  Follow these instructions at home:  Medicines  · Take over-the-counter and prescription medicines only as told by your doctor.  · Take your antibiotic medicine as told by your doctor. Do not stop taking the medicine even if you start to feel better.  General instructions  · Drink enough fluid to keep your pee clear or pale yellow.  · Avoid caffeine, tea, and carbonated drinks.  · Pee (urinate) often. Avoid holding in pee for long periods of time.  · Pee before and after sex.  · After pooping (having a bowel movement), women should wipe from front to back. Use each tissue only once.  · Keep all follow-up visits as told by your doctor. This is important.  Contact a doctor if:  · You do not feel better after 2 days.  · Your symptoms get worse.  · You have a fever.  Get help right away if:  · You cannot take your medicine or drink fluids as told.  · You have chills and shaking.  · You throw up (vomit).  · You have very bad pain in your side (flank) or back.  · You feel very weak or you pass out (faint).  This information is not intended to replace advice given to you by your health care provider. Make sure you discuss any questions you have with your health care provider.  Document Released: 01/25/2006 Document Revised: 05/25/2017 Document Reviewed: 04/11/2016  © 2017 Elsevier

## 2019-01-30 NOTE — ASSESSMENT & PLAN NOTE
Patient states recently she has been feeling very nauseated with darkened urine recently.  She also has bilateral flank pain worse on the right.  She denies emiliano blood in her urine.  She has an IUD in place so does not believe she is pregnant.  She states she urinates prior to and after sexual intercourse, drinks plenty of water, wipes from front to back and does not wear tight clothing to sleep.  She does have a history of severe pyelonephritis requiring IV antibiotics.  She denies frequency or urgency of urination.
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
## 2019-02-01 LAB
BACTERIA UR CULT: NORMAL
SIGNIFICANT IND 70042: NORMAL
SITE SITE: NORMAL
SOURCE SOURCE: NORMAL

## 2019-02-12 ENCOUNTER — OFFICE VISIT (OUTPATIENT)
Dept: MEDICAL GROUP | Facility: CLINIC | Age: 34
End: 2019-02-12
Payer: MEDICAID

## 2019-02-12 VITALS
BODY MASS INDEX: 22.45 KG/M2 | DIASTOLIC BLOOD PRESSURE: 78 MMHG | SYSTOLIC BLOOD PRESSURE: 126 MMHG | WEIGHT: 122 LBS | TEMPERATURE: 98.7 F | HEIGHT: 62 IN | OXYGEN SATURATION: 92 % | HEART RATE: 89 BPM | RESPIRATION RATE: 18 BRPM

## 2019-02-12 DIAGNOSIS — F41.9 ANXIETY: ICD-10-CM

## 2019-02-12 DIAGNOSIS — R06.02 MILD SHORTNESS OF BREATH: ICD-10-CM

## 2019-02-12 DIAGNOSIS — N12 PYELONEPHRITIS: ICD-10-CM

## 2019-02-12 DIAGNOSIS — F31.9 BIPOLAR 1 DISORDER (HCC): ICD-10-CM

## 2019-02-12 PROCEDURE — 99213 OFFICE O/P EST LOW 20 MIN: CPT | Performed by: PHYSICIAN ASSISTANT

## 2019-02-12 RX ORDER — INHALER, ASSIST DEVICES
1 SPACER (EA) MISCELLANEOUS ONCE
Qty: 1 EACH | Refills: 0 | Status: SHIPPED | OUTPATIENT
Start: 2019-02-12 | End: 2019-02-12

## 2019-02-12 RX ORDER — ALBUTEROL SULFATE 90 UG/1
2 AEROSOL, METERED RESPIRATORY (INHALATION) EVERY 6 HOURS PRN
Qty: 8.5 G | Refills: 6 | Status: SHIPPED | OUTPATIENT
Start: 2019-02-12 | End: 2020-09-03

## 2019-02-12 NOTE — PROGRESS NOTES
Chief Complaint   Patient presents with   • UTI     kidneys feeling better        HISTORY OF PRESENT ILLNESS: Patient is a 33 y.o. female established patient who presents today for evaluation and management of:    Pyelonephritis  Greatly improved with use of antibiotics.     Bipolar 1 disorder (CMS-HCC)  Much improved with use of zoloft over the past 2 weeks, currently taking 50mg daily. Denies SI/HI.     Anxiety  See bipolar note.     Mild shortness of breath  Patient states she feels well except a cough that seems to be lingering since she had a cold about 4 weeks ago. She does have wheeze every morning and is coughing up phlegm        Patient Active Problem List    Diagnosis Date Noted   • Mild shortness of breath 02/12/2019   • Pyelonephritis 01/29/2019   • Bipolar 1 disorder (HCC) 07/24/2017   • History of polycythemia 07/24/2017   • Family history of hypothyroidism 07/24/2017   • Vitamin D deficiency 07/24/2017   • Chronic back pain 03/05/2015   • Anxiety 03/05/2015   • Exposure to hepatitis C 03/05/2015       Allergies:Patient has no known allergies.    Current Outpatient Prescriptions   Medication Sig Dispense Refill   • albuterol 108 (90 Base) MCG/ACT Aero Soln inhalation aerosol Inhale 2 Puffs by mouth every 6 hours as needed for Shortness of Breath. 8.5 g 6   • Spacer/Aero-Holding Chambers (AEROCHAMBER MV) Misc 1 Each by Does not apply route Once for 1 dose. 1 Each 0   • gabapentin (NEURONTIN) 300 MG Cap Take 1 Cap by mouth 3 times a day. 90 Cap 3   • sertraline (ZOLOFT) 25 MG tablet Take 1 Tab by mouth every day. Increasing to 50mg by mouth once daily after two weeks. 45 Tab 1   • sulfamethoxazole-trimethoprim (BACTRIM DS) 800-160 MG tablet Take 1 Tab by mouth every 12 hours for 14 days. 28 Tab 0     No current facility-administered medications for this visit.        Social History   Substance Use Topics   • Smoking status: Former Smoker     Packs/day: 0.50     Years: 0.50     Types: Cigarettes     Quit  "date: 8/27/2016   • Smokeless tobacco: Former User     Quit date: 8/27/2016   • Alcohol use No       Family Status   Relation Status   • PGFa (Not Specified)   • PGMo (Not Specified)   • Mo (Not Specified)     Family History   Problem Relation Age of Onset   • Heart Attack Paternal Grandfather    • Heart Attack Paternal Grandmother    • Diabetes Paternal Grandmother    • Thyroid Mother        Review of Systems: See HPI above.   Constitutional: Negative for fever, chills, weight loss and malaise.   HENT: Negative for ear pain, nosebleeds, congestion, sore throat and neck pain.    Eyes: Negative for blurred vision.   Respiratory: positive for somewhat productive cough and AM wheezing.    Cardiovascular: Negative for chest pain, palpitations, orthopnea and leg swelling.   Genitourinary: Negative for dysuria, urgency and frequency.   Neurological: Negative for dizziness and headaches.   Endo/Heme/Allergies: Does not bruise/bleed easily.   Psychiatric/Behavioral: Negative for depression, suicidal ideas and memory loss.  The patient has much improved nervous/anxious and does not have insomnia.      Exam:  Blood pressure 126/78, pulse 89, temperature 37.1 °C (98.7 °F), temperature source Temporal, resp. rate 18, height 1.575 m (5' 2\"), weight 55.3 kg (122 lb), SpO2 92 %.  Body mass index is 22.31 kg/m².  General:  Healthy-Appearing female in NAD  Head: is grossly normal. Poor dentition.   Neck: Supple without masses. Thyroid is not visibly enlarged.  Pulmonary: soft wheeze to ausculation of bilateral lower lung fields . Normal effort. No rales, ronchi.  Cardiovascular: Regular rate and rhythm without murmur. Carotid pulses are intact and equal bilaterally.  Extremities: no clubbing, cyanosis, or edema.    Medical decision-making and discussion:  1. Mild shortness of breath    - albuterol 108 (90 Base) MCG/ACT Aero Soln inhalation aerosol; Inhale 2 Puffs by mouth every 6 hours as needed for Shortness of Breath.  Dispense: " 8.5 g; Refill: 6  - Spacer/Aero-Holding Chambers (AEROCHAMBER MV) Misc; 1 Each by Does not apply route Once for 1 dose.  Dispense: 1 Each; Refill: 0    2. Anxiety  Continue 50mg zoloft. Recheck in 4 weeks.     3. Bipolar 1 disorder (HCC)  See #2 above.     4. Pyelonephritis  Practice good hygiene, return for return of symptoms.      Please note that this dictation was created using voice recognition software. I have made every reasonable attempt to correct obvious errors, but I expect that there are errors of grammar and possibly content that I did not discover before finalizing the note.      Return for anxiety as scheduled. .

## 2019-02-12 NOTE — ASSESSMENT & PLAN NOTE
Much improved with use of zoloft over the past 2 weeks, currently taking 50mg daily. Denies SI/HI.

## 2019-02-12 NOTE — ASSESSMENT & PLAN NOTE
Patient states she feels well except a cough that seems to be lingering since she had a cold about 4 weeks ago. She does have wheeze every morning and is coughing up phlegm

## 2019-03-12 ENCOUNTER — OFFICE VISIT (OUTPATIENT)
Dept: MEDICAL GROUP | Facility: CLINIC | Age: 34
End: 2019-03-12
Payer: MEDICAID

## 2019-03-12 VITALS
HEIGHT: 62 IN | HEART RATE: 63 BPM | SYSTOLIC BLOOD PRESSURE: 120 MMHG | BODY MASS INDEX: 21.35 KG/M2 | RESPIRATION RATE: 16 BRPM | TEMPERATURE: 96.9 F | WEIGHT: 116 LBS | DIASTOLIC BLOOD PRESSURE: 72 MMHG | OXYGEN SATURATION: 99 %

## 2019-03-12 DIAGNOSIS — F31.9 BIPOLAR 1 DISORDER (HCC): ICD-10-CM

## 2019-03-12 DIAGNOSIS — F41.9 ANXIETY: ICD-10-CM

## 2019-03-12 PROCEDURE — 99213 OFFICE O/P EST LOW 20 MIN: CPT | Performed by: PHYSICIAN ASSISTANT

## 2019-03-12 RX ORDER — ESCITALOPRAM OXALATE 10 MG/1
10 TABLET ORAL DAILY
Qty: 45 TAB | Refills: 1 | Status: SHIPPED | OUTPATIENT
Start: 2019-03-12 | End: 2019-04-18

## 2019-03-12 NOTE — PROGRESS NOTES
Chief Complaint   Patient presents with   • Anxiety     FV        HISTORY OF PRESENT ILLNESS: Patient is a 33 y.o. female established patient who presents today for evaluation and management of:    Bipolar 1 disorder (CMS-HCC)  After use of Zoloft for approximately 3 weeks, patient began to develop symptoms of nausea after taking this medicine.  She states she stopped taking this medicine approximately 1 week ago and has not had a single episode of nausea since then.  She does use an excessive amount of marijuana.  Patient states that her anxiety symptoms with bipolar disorder, unstable mood have returned to some degree since stopping this medicine.  She wishes to start a different medication in order to control this at this time.  She continues to deny suicidal homicidal ideations.    Anxiety  See bipolar note.       Patient Active Problem List    Diagnosis Date Noted   • Mild shortness of breath 02/12/2019   • Pyelonephritis 01/29/2019   • Bipolar 1 disorder (HCC) 07/24/2017   • History of polycythemia 07/24/2017   • Family history of hypothyroidism 07/24/2017   • Vitamin D deficiency 07/24/2017   • Chronic back pain 03/05/2015   • Anxiety 03/05/2015   • Exposure to hepatitis C 03/05/2015       Allergies:Patient has no known allergies.    Current Outpatient Prescriptions   Medication Sig Dispense Refill   • escitalopram (LEXAPRO) 10 MG Tab Take 1 Tab by mouth every day. For two weeks then, increase to two tabs by mouth daily after that. 45 Tab 1   • gabapentin (NEURONTIN) 300 MG Cap Take 1 Cap by mouth 3 times a day. 90 Cap 3   • albuterol 108 (90 Base) MCG/ACT Aero Soln inhalation aerosol Inhale 2 Puffs by mouth every 6 hours as needed for Shortness of Breath. 8.5 g 6     No current facility-administered medications for this visit.        Social History   Substance Use Topics   • Smoking status: Former Smoker     Packs/day: 0.50     Years: 0.50     Types: Cigarettes     Quit date: 8/27/2016   • Smokeless tobacco:  "Former User     Quit date: 8/27/2016   • Alcohol use No       Family Status   Relation Status   • PGFa (Not Specified)   • PGMo (Not Specified)   • Mo (Not Specified)     Family History   Problem Relation Age of Onset   • Heart Attack Paternal Grandfather    • Heart Attack Paternal Grandmother    • Diabetes Paternal Grandmother    • Thyroid Mother        Review of Systems: See HPI above.   Constitutional: Negative for fever, chills, weight loss and malaise.   HENT: Negative for ear pain, nosebleeds, congestion, sore throat and neck pain.    Eyes: Negative for blurred vision.   Respiratory: Negative for shortness of breath,   Cardiovascular: Negative for chest pain, palpitations  Gastrointestinal: Positive for nausea, vomiting and abdominal pain.    Psychiatric/Behavioral: Positive for bipolar disorder without suicidal ideas and memory loss.  The patient is nervous/anxious and does not have insomnia.      Exam:  Blood pressure 120/72, pulse 63, temperature 36.1 °C (96.9 °F), temperature source Temporal, resp. rate 16, height 1.575 m (5' 2\"), weight 52.6 kg (116 lb), SpO2 99 %.  Body mass index is 21.22 kg/m².  General:  Healthy-Appearing female in NAD  Head: is grossly normal.  Poor dentition.  Neck: Supple without masses. Thyroid is not visibly enlarged.  Pulmonary: Clear to ausculation. Normal effort. No rales, ronchi, or wheezing.  Cardiovascular: Regular rate and rhythm without murmur. Carotid pulses are intact and equal bilaterally.  Extremities: no clubbing, cyanosis, or edema.  Behavioral: Somewhat rapid speech today with otherwise normal mood and affect.    Medical decision-making and discussion:  1. Anxiety    - escitalopram (LEXAPRO) 10 MG Tab; Take 1 Tab by mouth every day. For two weeks then, increase to two tabs by mouth daily after that.  Dispense: 45 Tab; Refill: 1    2. Bipolar 1 disorder (HCC)    - escitalopram (LEXAPRO) 10 MG Tab; Take 1 Tab by mouth every day. For two weeks then, increase to two " tabs by mouth daily after that.  Dispense: 45 Tab; Refill: 1      Please note that this dictation was created using voice recognition software. I have made every reasonable attempt to correct obvious errors, but I expect that there are errors of grammar and possibly content that I did not discover before finalizing the note.      Return in about 4 weeks (around 4/9/2019) for bipolar.

## 2019-03-12 NOTE — ASSESSMENT & PLAN NOTE
After use of Zoloft for approximately 3 weeks, patient began to develop symptoms of nausea after taking this medicine.  She states she stopped taking this medicine approximately 1 week ago and has not had a single episode of nausea since then.  She does use an excessive amount of marijuana.  Patient states that her anxiety symptoms with bipolar disorder, unstable mood have returned to some degree since stopping this medicine.  She wishes to start a different medication in order to control this at this time.  She continues to deny suicidal homicidal ideations.

## 2019-04-18 ENCOUNTER — OFFICE VISIT (OUTPATIENT)
Dept: MEDICAL GROUP | Facility: CLINIC | Age: 34
End: 2019-04-18
Payer: MEDICAID

## 2019-04-18 VITALS
WEIGHT: 117 LBS | DIASTOLIC BLOOD PRESSURE: 64 MMHG | SYSTOLIC BLOOD PRESSURE: 122 MMHG | OXYGEN SATURATION: 98 % | RESPIRATION RATE: 16 BRPM | BODY MASS INDEX: 21.53 KG/M2 | HEART RATE: 97 BPM | HEIGHT: 62 IN | TEMPERATURE: 99.5 F

## 2019-04-18 DIAGNOSIS — G47.00 INSOMNIA, UNSPECIFIED TYPE: ICD-10-CM

## 2019-04-18 DIAGNOSIS — R53.83 LETHARGY: ICD-10-CM

## 2019-04-18 DIAGNOSIS — F41.9 ANXIETY: ICD-10-CM

## 2019-04-18 DIAGNOSIS — F31.9 BIPOLAR 1 DISORDER (HCC): ICD-10-CM

## 2019-04-18 PROCEDURE — 99213 OFFICE O/P EST LOW 20 MIN: CPT | Performed by: PHYSICIAN ASSISTANT

## 2019-04-18 RX ORDER — TRAZODONE HYDROCHLORIDE 50 MG/1
50 TABLET ORAL
Qty: 30 TAB | Refills: 1 | Status: SHIPPED | OUTPATIENT
Start: 2019-04-18 | End: 2019-06-05 | Stop reason: SDUPTHER

## 2019-04-18 RX ORDER — BUSPIRONE HYDROCHLORIDE 10 MG/1
10 TABLET ORAL 2 TIMES DAILY
Qty: 60 TAB | Refills: 2 | Status: SHIPPED | OUTPATIENT
Start: 2019-04-18 | End: 2019-06-05 | Stop reason: SDUPTHER

## 2019-04-18 NOTE — ASSESSMENT & PLAN NOTE
This is a chronic condition, not well controlled at this time. Patient has been taking celexa for about 4 weeks with instances of syncope and hallucinations. She wishes to stop this medicine and try something else due to this side effect.  Zoloft caused severe nausea so she does not wish to use this medication.  Patient states that her anxiety symptoms with bipolar disorder, unstable mood have been extremely well controlled on celexa but the side effects are not worth this at this time.  She wishes to start a different medication. She continues to deny suicidal or homicidal ideations. She states her anxiety and insomnia are the two worst symptoms of her bipolar disorder at this time.

## 2019-04-18 NOTE — PROGRESS NOTES
Chief Complaint   Patient presents with   • Faint     x2 due to medication       HISTORY OF PRESENT ILLNESS: Patient is a 33 y.o. female established patient who presents today for evaluation and management of:    Bipolar 1 disorder (CMS-HCC)  This is a chronic condition, not well controlled at this time. Patient has been taking celexa for about 4 weeks with instances of syncope and hallucinations. She wishes to stop this medicine and try something else due to this side effect.  Zoloft caused severe nausea so she does not wish to use this medication.  Patient states that her anxiety symptoms with bipolar disorder, unstable mood have been extremely well controlled on celexa but the side effects are not worth this at this time.  She wishes to start a different medication. She continues to deny suicidal or homicidal ideations. She states her anxiety and insomnia are the two worst symptoms of her bipolar disorder at this time.    Anxiety  See bipolar note.        Patient Active Problem List    Diagnosis Date Noted   • Mild shortness of breath 02/12/2019   • Pyelonephritis 01/29/2019   • Bipolar 1 disorder (HCC) 07/24/2017   • History of polycythemia 07/24/2017   • Family history of hypothyroidism 07/24/2017   • Vitamin D deficiency 07/24/2017   • Chronic back pain 03/05/2015   • Anxiety 03/05/2015   • Exposure to hepatitis C 03/05/2015       Allergies:Patient has no known allergies.    Current Outpatient Prescriptions   Medication Sig Dispense Refill   • traZODone (DESYREL) 50 MG Tab Take 1 Tab by mouth every bedtime. 30 Tab 1   • busPIRone (BUSPAR) 10 MG Tab tablet Take 1 Tab by mouth 2 times a day. 60 Tab 2   • albuterol 108 (90 Base) MCG/ACT Aero Soln inhalation aerosol Inhale 2 Puffs by mouth every 6 hours as needed for Shortness of Breath. 8.5 g 6   • gabapentin (NEURONTIN) 300 MG Cap Take 1 Cap by mouth 3 times a day. 90 Cap 3     No current facility-administered medications for this visit.        Social History  "  Substance Use Topics   • Smoking status: Former Smoker     Packs/day: 0.50     Years: 0.50     Types: Cigarettes     Quit date: 8/27/2016   • Smokeless tobacco: Former User     Quit date: 8/27/2016   • Alcohol use No       Family Status   Relation Status   • PGFa (Not Specified)   • PGMo (Not Specified)   • Mo (Not Specified)     Family History   Problem Relation Age of Onset   • Heart Attack Paternal Grandfather    • Heart Attack Paternal Grandmother    • Diabetes Paternal Grandmother    • Thyroid Mother        Review of Systems: See HPI above.   Constitutional: Negative for fever, chills, weight loss and positive for fatigue due to insomnia.   HENT: Negative for ear pain, nosebleeds, congestion, sore throat and neck pain.  positive for dental caries.   Eyes: Negative for blurred vision.   Respiratory: Negative for shortness of breath, cough, sputum production and wheezing.    Cardiovascular: Negative for chest pain, palpitations, orthopnea and leg swelling.   Neurological: Positive for reduced sensation in hands and syncopal episodes. Negative for dizziness,  tremors, focal weakness and headaches.   Endo/Heme/Allergies: Does not bruise/bleed easily.   Psychiatric/Behavioral: Positive for anxiety and bipolar disorder with PTSD. Negative for suicidal ideas and memory loss.  The patient is nervous/anxious and does have insomnia.      Exam:  /64 (BP Location: Right arm, Patient Position: Sitting, BP Cuff Size: Adult)   Pulse 97   Temp 37.5 °C (99.5 °F) (Temporal)   Resp 16   Ht 1.575 m (5' 2\")   Wt 53.1 kg (117 lb)   SpO2 98%   Body mass index is 21.4 kg/m².  General:  Healthy-Appearing female in NAD  Head: is grossly normal. Poor dentition.   Neck: Supple without masses. Thyroid is not visibly enlarged.  Pulmonary: Clear to ausculation. Normal effort. No rales, ronchi, or wheezing.  Cardiovascular: Regular rate and rhythm without murmur. Carotid pulses are intact and equal bilaterally.  Extremities: no " clubbing, cyanosis, or edema.  Behavioral: patient is somewhat labile today, excited about potential recovery and tearful about the way a psychiatrist treated her. Generally normal mood and affect.     Medical decision-making and discussion:  1. Bipolar 1 disorder (HCC)  Take 0.5 tab celexa for another 4 days then, stop this. Start the following this evening:   - traZODone (DESYREL) 50 MG Tab; Take 1 Tab by mouth every bedtime.  Dispense: 30 Tab; Refill: 1    2. Insomnia, unspecified type    - traZODone (DESYREL) 50 MG Tab; Take 1 Tab by mouth every bedtime.  Dispense: 30 Tab; Refill: 1    3. Lethargy    - VITAMIN D,25 HYDROXY; Future  - Comp Metabolic Panel; Future  - VITAMIN B12; Future  - TSH WITH REFLEX TO FT4; Future  - CBC WITHOUT DIFFERENTIAL; Future  - IRON/TOTAL IRON BIND; Future    4. Anxiety  Start the following medication on the day after last celexa dose.   - busPIRone (BUSPAR) 10 MG Tab tablet; Take 1 Tab by mouth 2 times a day.  Dispense: 60 Tab; Refill: 2      Please note that this dictation was created using voice recognition software. I have made every reasonable attempt to correct obvious errors, but I expect that there are errors of grammar and possibly content that I did not discover before finalizing the note.      Return in about 4 weeks (around 5/16/2019) for depression med checkup.

## 2019-05-30 LAB
25(OH)D3+25(OH)D2 SERPL-MCNC: 31.3 NG/ML (ref 30–100)
ALBUMIN SERPL-MCNC: 4.5 G/DL (ref 3.5–5.5)
ALBUMIN/GLOB SERPL: 2 {RATIO} (ref 1.2–2.2)
ALP SERPL-CCNC: 56 IU/L (ref 39–117)
ALT SERPL-CCNC: 18 IU/L (ref 0–32)
AST SERPL-CCNC: 17 IU/L (ref 0–40)
BILIRUB SERPL-MCNC: 0.4 MG/DL (ref 0–1.2)
BUN SERPL-MCNC: 13 MG/DL (ref 6–20)
BUN/CREAT SERPL: 17 (ref 9–23)
CALCIUM SERPL-MCNC: 9.7 MG/DL (ref 8.7–10.2)
CHLORIDE SERPL-SCNC: 104 MMOL/L (ref 96–106)
CO2 SERPL-SCNC: 23 MMOL/L (ref 20–29)
CREAT SERPL-MCNC: 0.77 MG/DL (ref 0.57–1)
ERYTHROCYTE [DISTWIDTH] IN BLOOD BY AUTOMATED COUNT: 13.8 % (ref 12.3–15.4)
GLOBULIN SER CALC-MCNC: 2.3 G/DL (ref 1.5–4.5)
GLUCOSE SERPL-MCNC: 76 MG/DL (ref 65–99)
HCT VFR BLD AUTO: 40.1 % (ref 34–46.6)
HGB BLD-MCNC: 13.9 G/DL (ref 11.1–15.9)
IRON SATN MFR SERPL: 26 % (ref 15–55)
IRON SERPL-MCNC: 68 UG/DL (ref 27–159)
MCH RBC QN AUTO: 30.2 PG (ref 26.6–33)
MCHC RBC AUTO-ENTMCNC: 34.7 G/DL (ref 31.5–35.7)
MCV RBC AUTO: 87 FL (ref 79–97)
NRBC BLD AUTO-RTO: NORMAL %
PLATELET # BLD AUTO: 249 X10E3/UL (ref 150–450)
POTASSIUM SERPL-SCNC: 4.5 MMOL/L (ref 3.5–5.2)
PROT SERPL-MCNC: 6.8 G/DL (ref 6–8.5)
RBC # BLD AUTO: 4.61 X10E6/UL (ref 3.77–5.28)
SODIUM SERPL-SCNC: 142 MMOL/L (ref 134–144)
TIBC SERPL-MCNC: 266 UG/DL (ref 250–450)
TSH SERPL DL<=0.005 MIU/L-ACNC: 1.77 UIU/ML (ref 0.45–4.5)
UIBC SERPL-MCNC: 198 UG/DL (ref 131–425)
VIT B12 SERPL-MCNC: 1057 PG/ML (ref 232–1245)
WBC # BLD AUTO: 5.3 X10E3/UL (ref 3.4–10.8)

## 2019-06-04 ENCOUNTER — NON-PROVIDER VISIT (OUTPATIENT)
Dept: URGENT CARE | Facility: PHYSICIAN GROUP | Age: 34
End: 2019-06-04

## 2019-06-04 DIAGNOSIS — Z02.1 PRE-EMPLOYMENT DRUG SCREENING: ICD-10-CM

## 2019-06-04 LAB
AMP AMPHETAMINE: NORMAL
COC COCAINE: NORMAL
INT CON NEG: NEGATIVE
INT CON POS: POSITIVE
MET METHAMPHETAMINES: NORMAL
OPI OPIATES: NORMAL
PCP PHENCYCLIDINE: NORMAL
POC DRUG COMMENT 753798-OCCUPATIONAL HEALTH: NORMAL
THC: NORMAL

## 2019-06-04 PROCEDURE — 80305 DRUG TEST PRSMV DIR OPT OBS: CPT | Performed by: FAMILY MEDICINE

## 2019-06-05 ENCOUNTER — OFFICE VISIT (OUTPATIENT)
Dept: MEDICAL GROUP | Facility: CLINIC | Age: 34
End: 2019-06-05
Payer: MEDICAID

## 2019-06-05 VITALS
HEART RATE: 70 BPM | HEIGHT: 62 IN | OXYGEN SATURATION: 98 % | SYSTOLIC BLOOD PRESSURE: 100 MMHG | BODY MASS INDEX: 22.45 KG/M2 | TEMPERATURE: 98.2 F | WEIGHT: 122 LBS | DIASTOLIC BLOOD PRESSURE: 60 MMHG

## 2019-06-05 DIAGNOSIS — F41.9 ANXIETY: ICD-10-CM

## 2019-06-05 DIAGNOSIS — F31.9 BIPOLAR 1 DISORDER (HCC): ICD-10-CM

## 2019-06-05 DIAGNOSIS — M54.41 CHRONIC BILATERAL LOW BACK PAIN WITH BILATERAL SCIATICA: ICD-10-CM

## 2019-06-05 DIAGNOSIS — G89.29 CHRONIC BILATERAL LOW BACK PAIN WITH BILATERAL SCIATICA: ICD-10-CM

## 2019-06-05 DIAGNOSIS — M54.42 CHRONIC BILATERAL LOW BACK PAIN WITH BILATERAL SCIATICA: ICD-10-CM

## 2019-06-05 PROBLEM — N12 PYELONEPHRITIS: Status: RESOLVED | Noted: 2019-01-29 | Resolved: 2019-06-05

## 2019-06-05 PROCEDURE — 99214 OFFICE O/P EST MOD 30 MIN: CPT | Performed by: PHYSICIAN ASSISTANT

## 2019-06-05 RX ORDER — GABAPENTIN 400 MG/1
400 CAPSULE ORAL 3 TIMES DAILY
Qty: 270 CAP | Refills: 1 | Status: SHIPPED | OUTPATIENT
Start: 2019-06-05 | End: 2019-11-14 | Stop reason: SDUPTHER

## 2019-06-05 RX ORDER — TRAZODONE HYDROCHLORIDE 50 MG/1
50 TABLET ORAL
Qty: 90 TAB | Refills: 1 | Status: SHIPPED | OUTPATIENT
Start: 2019-06-05 | End: 2019-12-31

## 2019-06-05 RX ORDER — BUSPIRONE HYDROCHLORIDE 10 MG/1
10 TABLET ORAL 3 TIMES DAILY
Qty: 270 TAB | Refills: 1 | Status: SHIPPED | OUTPATIENT
Start: 2019-06-05 | End: 2019-11-14 | Stop reason: SDUPTHER
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
## 2019-06-06 NOTE — ASSESSMENT & PLAN NOTE
This is a chronic condition currently well controlled on buspirone 10 mg tablets 3 times per day.  This patient is requesting medication refills at this time.  She denies any adverse side effects of this medicine.

## 2019-06-06 NOTE — ASSESSMENT & PLAN NOTE
This is a chronic condition that is currently well controlled.  The patient has been taking trazodone 50 mg every evening with buspirone 10 mg 3 times per day with excellent control of mood.  She denies adverse side effects.  She feels the trazodone helps her remain mentally stable although she states she has some difficulty with inattention.  She continues to deny suicidal and homicidal ideations.
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
## 2019-06-19 ENCOUNTER — NON-PROVIDER VISIT (OUTPATIENT)
Dept: OCCUPATIONAL MEDICINE | Facility: CLINIC | Age: 34
End: 2019-06-19

## 2019-06-19 DIAGNOSIS — Z02.1 PRE-EMPLOYMENT DRUG SCREENING: ICD-10-CM

## 2019-06-19 DIAGNOSIS — Z02.83 ENCOUNTER FOR DRUG SCREENING: ICD-10-CM

## 2019-06-19 PROCEDURE — 80305 DRUG TEST PRSMV DIR OPT OBS: CPT | Performed by: PREVENTIVE MEDICINE

## 2019-06-19 PROCEDURE — 8895 PB URINE 6 PANEL AFTER HOURS: Performed by: PREVENTIVE MEDICINE

## 2019-06-19 PROCEDURE — 82075 ASSAY OF BREATH ETHANOL: CPT | Performed by: PREVENTIVE MEDICINE

## 2019-06-28 LAB
AMP AMPHETAMINE: NORMAL
BREATH ALCOHOL COMMENT: NORMAL
COC COCAINE: NORMAL
INT CON NEG: NORMAL
INT CON POS: NORMAL
MET METHAMPHETAMINES: NORMAL
OPI OPIATES: NORMAL
PCP PHENCYCLIDINE: NORMAL
POC BREATHALIZER: 0 PERCENT (ref 0–0.01)
POC DRUG COMMENT 753798-OCCUPATIONAL HEALTH: NEGATIVE
THC: NORMAL

## 2019-11-14 DIAGNOSIS — G89.29 CHRONIC BILATERAL LOW BACK PAIN WITH BILATERAL SCIATICA: ICD-10-CM

## 2019-11-14 DIAGNOSIS — M54.42 CHRONIC BILATERAL LOW BACK PAIN WITH BILATERAL SCIATICA: ICD-10-CM

## 2019-11-14 DIAGNOSIS — M54.41 CHRONIC BILATERAL LOW BACK PAIN WITH BILATERAL SCIATICA: ICD-10-CM

## 2019-11-14 DIAGNOSIS — F41.9 ANXIETY: ICD-10-CM

## 2019-11-14 RX ORDER — GABAPENTIN 400 MG/1
CAPSULE ORAL
Qty: 270 CAP | Refills: 0 | Status: SHIPPED | OUTPATIENT
Start: 2019-11-14 | End: 2020-01-16 | Stop reason: SDUPTHER

## 2019-11-14 RX ORDER — BUSPIRONE HYDROCHLORIDE 10 MG/1
TABLET ORAL
Qty: 270 TAB | Refills: 0 | Status: SHIPPED | OUTPATIENT
Start: 2019-11-14 | End: 2020-01-16 | Stop reason: SDUPTHER

## 2019-11-14 NOTE — TELEPHONE ENCOUNTER
Was the patient seen in the last year in this department? Yes    Does patient have an active prescription for medications requested? Yes    Received Request Via: Pharmacy    Non-Provider Visit on 06/19/2019   Component Date Value   • POC Breathalizer 06/19/2019 0.00    • POC Urine Drug Screen Co* 06/19/2019 NEGATIVE    • Internal Control Positive 06/19/2019 Valid    • Internal Control Negative 06/19/2019 Valid    Non-Provider Visit on 06/04/2019   Component Date Value   • POC Urine Drug Screen Co* 06/04/2019 neg    • Internal Control Positive 06/04/2019 Positive    • Internal Control Negative 06/04/2019 Negative    Orders Only on 05/29/2019   Component Date Value   • Glucose 05/29/2019 76    • Bun 05/29/2019 13    • Creatinine 05/29/2019 0.77    • GFR If Non  Ameri* 05/29/2019 102    • GFR If  05/29/2019 117    • Bun-Creatinine Ratio 05/29/2019 17    • Sodium 05/29/2019 142    • Potassium 05/29/2019 4.5    • Chloride 05/29/2019 104    • Co2 05/29/2019 23    • Calcium 05/29/2019 9.7    • Total Protein 05/29/2019 6.8    • Albumin 05/29/2019 4.5    • Globulin 05/29/2019 2.3    • A-G Ratio 05/29/2019 2.0    • Total Bilirubin 05/29/2019 0.4    • Alkaline Phosphatase 05/29/2019 56    • AST(SGOT) 05/29/2019 17    • ALT(SGPT) 05/29/2019 18    • WBC 05/29/2019 5.3    • RBC 05/29/2019 4.61    • Hemoglobin 05/29/2019 13.9    • Hematocrit 05/29/2019 40.1    • MCV 05/29/2019 87    • MCH 05/29/2019 30.2    • MCHC 05/29/2019 34.7    • RDW 05/29/2019 13.8    • Platelet Count 05/29/2019 249    • Nucleated RBC 05/29/2019 CANCELED    • Total Iron Binding 05/29/2019 266    • Unsat Iron Binding 05/29/2019 198    • Iron 05/29/2019 68    • % Saturation 05/29/2019 26    • 25-Hydroxy   Vitamin D 25 05/29/2019 31.3    • TSH 05/29/2019 1.770    • Vitamin B12 -True Cobala* 05/29/2019 1,057    Hospital Outpatient Visit on 01/29/2019   Component Date Value   • Significant Indicator 01/29/2019 NEG    • Source 01/29/2019 UR     • Site 01/29/2019 URINE, CLEAN CATCH    • Urine Culture 01/29/2019 Mixed skin beverly 10-50,000 cfu/mL    ]

## 2019-12-31 DIAGNOSIS — F31.9 BIPOLAR 1 DISORDER (HCC): ICD-10-CM

## 2019-12-31 RX ORDER — TRAZODONE HYDROCHLORIDE 50 MG/1
TABLET ORAL
Qty: 90 TAB | Refills: 0 | Status: SHIPPED | OUTPATIENT
Start: 2019-12-31 | End: 2020-04-07

## 2020-01-16 ENCOUNTER — OFFICE VISIT (OUTPATIENT)
Dept: MEDICAL GROUP | Facility: CLINIC | Age: 35
End: 2020-01-16
Payer: COMMERCIAL

## 2020-01-16 VITALS
SYSTOLIC BLOOD PRESSURE: 122 MMHG | OXYGEN SATURATION: 96 % | RESPIRATION RATE: 14 BRPM | HEART RATE: 89 BPM | TEMPERATURE: 98.2 F | DIASTOLIC BLOOD PRESSURE: 70 MMHG | HEIGHT: 62 IN | WEIGHT: 118 LBS | BODY MASS INDEX: 21.71 KG/M2

## 2020-01-16 DIAGNOSIS — F41.9 ANXIETY: ICD-10-CM

## 2020-01-16 DIAGNOSIS — M54.42 CHRONIC BILATERAL LOW BACK PAIN WITH BILATERAL SCIATICA: ICD-10-CM

## 2020-01-16 DIAGNOSIS — K52.9 COLITIS: ICD-10-CM

## 2020-01-16 DIAGNOSIS — G89.29 CHRONIC BILATERAL LOW BACK PAIN WITH BILATERAL SCIATICA: ICD-10-CM

## 2020-01-16 DIAGNOSIS — M54.41 CHRONIC BILATERAL LOW BACK PAIN WITH BILATERAL SCIATICA: ICD-10-CM

## 2020-01-16 PROBLEM — Z83.49 FAMILY HISTORY OF HYPOTHYROIDISM: Status: RESOLVED | Noted: 2017-07-24 | Resolved: 2020-01-16

## 2020-01-16 PROCEDURE — 99213 OFFICE O/P EST LOW 20 MIN: CPT | Performed by: PHYSICIAN ASSISTANT

## 2020-01-16 RX ORDER — MESALAMINE 500 MG/1
1000 CAPSULE, EXTENDED RELEASE ORAL 4 TIMES DAILY
Qty: 480 CAP | Refills: 0 | Status: SHIPPED | OUTPATIENT
Start: 2020-01-16 | End: 2020-09-03 | Stop reason: SDUPTHER

## 2020-01-16 RX ORDER — GABAPENTIN 400 MG/1
400 CAPSULE ORAL 3 TIMES DAILY
Qty: 270 CAP | Refills: 3 | Status: SHIPPED | OUTPATIENT
Start: 2020-01-16 | End: 2020-07-14 | Stop reason: SDUPTHER

## 2020-01-16 RX ORDER — BUSPIRONE HYDROCHLORIDE 10 MG/1
10 TABLET ORAL 3 TIMES DAILY
Qty: 270 TAB | Refills: 3 | Status: SHIPPED | OUTPATIENT
Start: 2020-01-16 | End: 2020-04-17 | Stop reason: SDUPTHER

## 2020-01-16 RX ORDER — AMOXICILLIN 500 MG/1
500 CAPSULE ORAL 3 TIMES DAILY
COMMUNITY
End: 2020-01-16
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
## 2020-01-16 NOTE — ASSESSMENT & PLAN NOTE
Patient has been sen in ER and diagnosed with colitis. She feels she has to use the restroom very frequently with soft stools. She does occasionally have constipation and does feel her belly is bloated. She has cut out soda, seeds. Is eating more soft foods. She does occasionally have blood streaking her stools occaisonally.
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
## 2020-03-20 ENCOUNTER — TELEPHONE (OUTPATIENT)
Dept: MEDICAL GROUP | Facility: CLINIC | Age: 35
End: 2020-03-20

## 2020-03-20 NOTE — TELEPHONE ENCOUNTER
Pt came to clinic with another adult female who I assumed was her mom. Mom stated that there was a mix up with the pharmacy and us with her prescription of buspar. Stated that the last time she saw Veronica that Veronica should have handled this. I let them know that Veronica wasn't in office on Fridays and I would put the request in for her. I apologized and said that Veronica will be back Monday. Mom was upset and stated that patient wasn't gonna make it to Monday and they would just take her to ER as she has already not been taking it for a week and half.       I looked in patient chart and noticed that Veronica did fill it in January for 90 days. I called the pharmacy to figure out was going on. They did find it and said that they will work on filling it right then. They asked me to call patient to let them know. I tried calling both patient number but they were disconnected.

## 2020-04-07 DIAGNOSIS — F31.9 BIPOLAR 1 DISORDER (HCC): ICD-10-CM

## 2020-04-07 RX ORDER — TRAZODONE HYDROCHLORIDE 50 MG/1
TABLET ORAL
Qty: 90 TAB | Refills: 0 | Status: SHIPPED | OUTPATIENT
Start: 2020-04-07 | End: 2020-07-13

## 2020-04-07 NOTE — TELEPHONE ENCOUNTER
Was the patient seen in the last year in this department? Yes    Does patient have an active prescription for medications requested? Yes    Received Request Via: Pharmacy    Non-Provider Visit on 06/19/2019   Component Date Value   • POC Breathalizer 06/19/2019 0.00    • POC Urine Drug Screen Co* 06/19/2019 NEGATIVE    • Internal Control Positive 06/19/2019 Valid    • Internal Control Negative 06/19/2019 Valid    Non-Provider Visit on 06/04/2019   Component Date Value   • POC Urine Drug Screen Co* 06/04/2019 neg    • Internal Control Positive 06/04/2019 Positive    • Internal Control Negative 06/04/2019 Negative    Orders Only on 05/29/2019   Component Date Value   • Glucose 05/29/2019 76    • Bun 05/29/2019 13    • Creatinine 05/29/2019 0.77    • GFR If Non  Ameri* 05/29/2019 102    • GFR If  05/29/2019 117    • Bun-Creatinine Ratio 05/29/2019 17    • Sodium 05/29/2019 142    • Potassium 05/29/2019 4.5    • Chloride 05/29/2019 104    • Co2 05/29/2019 23    • Calcium 05/29/2019 9.7    • Total Protein 05/29/2019 6.8    • Albumin 05/29/2019 4.5    • Globulin 05/29/2019 2.3    • A-G Ratio 05/29/2019 2.0    • Total Bilirubin 05/29/2019 0.4    • Alkaline Phosphatase 05/29/2019 56    • AST(SGOT) 05/29/2019 17    • ALT(SGPT) 05/29/2019 18    • WBC 05/29/2019 5.3    • RBC 05/29/2019 4.61    • Hemoglobin 05/29/2019 13.9    • Hematocrit 05/29/2019 40.1    • MCV 05/29/2019 87    • MCH 05/29/2019 30.2    • MCHC 05/29/2019 34.7    • RDW 05/29/2019 13.8    • Platelet Count 05/29/2019 249    • Nucleated RBC 05/29/2019 CANCELED    • Total Iron Binding 05/29/2019 266    • Unsat Iron Binding 05/29/2019 198    • Iron 05/29/2019 68    • % Saturation 05/29/2019 26    • 25-Hydroxy   Vitamin D 25 05/29/2019 31.3    • TSH 05/29/2019 1.770    • Vitamin B12 -True Cobala* 05/29/2019 1,057    ]

## 2020-04-17 DIAGNOSIS — F41.9 ANXIETY: ICD-10-CM

## 2020-04-17 RX ORDER — BUSPIRONE HYDROCHLORIDE 10 MG/1
10 TABLET ORAL 3 TIMES DAILY
Qty: 270 TAB | Refills: 0 | Status: SHIPPED | OUTPATIENT
Start: 2020-04-17 | End: 2020-07-14 | Stop reason: SDUPTHER

## 2020-04-17 NOTE — TELEPHONE ENCOUNTER
Was the patient seen in the last year in this department? Yes    Does patient have an active prescription for medications requested? Yes    Received Request Via: Patient    Non-Provider Visit on 06/19/2019   Component Date Value   • POC Breathalizer 06/19/2019 0.00    • POC Urine Drug Screen Co* 06/19/2019 NEGATIVE    • Internal Control Positive 06/19/2019 Valid    • Internal Control Negative 06/19/2019 Valid    Non-Provider Visit on 06/04/2019   Component Date Value   • POC Urine Drug Screen Co* 06/04/2019 neg    • Internal Control Positive 06/04/2019 Positive    • Internal Control Negative 06/04/2019 Negative    Orders Only on 05/29/2019   Component Date Value   • Glucose 05/29/2019 76    • Bun 05/29/2019 13    • Creatinine 05/29/2019 0.77    • GFR If Non  Ameri* 05/29/2019 102    • GFR If  05/29/2019 117    • Bun-Creatinine Ratio 05/29/2019 17    • Sodium 05/29/2019 142    • Potassium 05/29/2019 4.5    • Chloride 05/29/2019 104    • Co2 05/29/2019 23    • Calcium 05/29/2019 9.7    • Total Protein 05/29/2019 6.8    • Albumin 05/29/2019 4.5    • Globulin 05/29/2019 2.3    • A-G Ratio 05/29/2019 2.0    • Total Bilirubin 05/29/2019 0.4    • Alkaline Phosphatase 05/29/2019 56    • AST(SGOT) 05/29/2019 17    • ALT(SGPT) 05/29/2019 18    • WBC 05/29/2019 5.3    • RBC 05/29/2019 4.61    • Hemoglobin 05/29/2019 13.9    • Hematocrit 05/29/2019 40.1    • MCV 05/29/2019 87    • MCH 05/29/2019 30.2    • MCHC 05/29/2019 34.7    • RDW 05/29/2019 13.8    • Platelet Count 05/29/2019 249    • Nucleated RBC 05/29/2019 CANCELED    • Total Iron Binding 05/29/2019 266    • Unsat Iron Binding 05/29/2019 198    • Iron 05/29/2019 68    • % Saturation 05/29/2019 26    • 25-Hydroxy   Vitamin D 25 05/29/2019 31.3    • TSH 05/29/2019 1.770    • Vitamin B12 -True Cobala* 05/29/2019 1,057    ]

## 2020-07-11 DIAGNOSIS — F31.9 BIPOLAR 1 DISORDER (HCC): ICD-10-CM

## 2020-07-13 NOTE — TELEPHONE ENCOUNTER
Was the patient seen in the last year in this department? Yes    Does patient have an active prescription for medications requested? Yes    Received Request Via: Pharmacy    No visits with results within 1 Year(s) from this visit.   Latest known visit with results is:   Non-Provider Visit on 06/19/2019   Component Date Value   • POC Breathalizer 06/19/2019 0.00    • POC Urine Drug Screen Co* 06/19/2019 NEGATIVE    • Internal Control Positive 06/19/2019 Valid    • Internal Control Negative 06/19/2019 Valid    ]

## 2020-07-14 DIAGNOSIS — F41.9 ANXIETY: ICD-10-CM

## 2020-07-14 DIAGNOSIS — M54.42 CHRONIC BILATERAL LOW BACK PAIN WITH BILATERAL SCIATICA: ICD-10-CM

## 2020-07-14 DIAGNOSIS — G89.29 CHRONIC BILATERAL LOW BACK PAIN WITH BILATERAL SCIATICA: ICD-10-CM

## 2020-07-14 DIAGNOSIS — M54.41 CHRONIC BILATERAL LOW BACK PAIN WITH BILATERAL SCIATICA: ICD-10-CM

## 2020-07-14 RX ORDER — TRAZODONE HYDROCHLORIDE 50 MG/1
TABLET ORAL
Qty: 90 TAB | Refills: 0 | Status: SHIPPED | OUTPATIENT
Start: 2020-07-14 | End: 2020-09-03 | Stop reason: SDUPTHER

## 2020-07-15 RX ORDER — GABAPENTIN 400 MG/1
400 CAPSULE ORAL 3 TIMES DAILY
Qty: 270 CAP | Refills: 0 | Status: SHIPPED | OUTPATIENT
Start: 2020-07-15 | End: 2020-09-03 | Stop reason: SDUPTHER

## 2020-07-15 RX ORDER — BUSPIRONE HYDROCHLORIDE 10 MG/1
10 TABLET ORAL 3 TIMES DAILY
Qty: 270 TAB | Refills: 0 | Status: SHIPPED | OUTPATIENT
Start: 2020-07-15 | End: 2020-09-03 | Stop reason: SDUPTHER

## 2020-08-04 ENCOUNTER — OFFICE VISIT (OUTPATIENT)
Dept: URGENT CARE | Facility: CLINIC | Age: 35
End: 2020-08-04
Payer: COMMERCIAL

## 2020-08-04 ENCOUNTER — APPOINTMENT (OUTPATIENT)
Dept: RADIOLOGY | Facility: IMAGING CENTER | Age: 35
End: 2020-08-04
Attending: PHYSICIAN ASSISTANT
Payer: COMMERCIAL

## 2020-08-04 VITALS
HEIGHT: 62 IN | DIASTOLIC BLOOD PRESSURE: 62 MMHG | SYSTOLIC BLOOD PRESSURE: 104 MMHG | WEIGHT: 112 LBS | BODY MASS INDEX: 20.61 KG/M2 | TEMPERATURE: 97.8 F | HEART RATE: 61 BPM | RESPIRATION RATE: 16 BRPM | OXYGEN SATURATION: 97 %

## 2020-08-04 DIAGNOSIS — M77.8 RIGHT WRIST TENDINITIS: ICD-10-CM

## 2020-08-04 PROCEDURE — 99203 OFFICE O/P NEW LOW 30 MIN: CPT | Performed by: PHYSICIAN ASSISTANT

## 2020-08-04 PROCEDURE — 73110 X-RAY EXAM OF WRIST: CPT | Mod: TC,RT | Performed by: PHYSICIAN ASSISTANT

## 2020-08-04 RX ORDER — METHYLPREDNISOLONE 4 MG/1
TABLET ORAL
Qty: 21 TAB | Refills: 0 | Status: SHIPPED | OUTPATIENT
Start: 2020-08-04 | End: 2020-09-03

## 2020-08-04 ASSESSMENT — FIBROSIS 4 INDEX: FIB4 SCORE: 0.55

## 2020-08-04 ASSESSMENT — ENCOUNTER SYMPTOMS
DIZZINESS: 0
CHILLS: 0
FEVER: 0
NECK PAIN: 0
FALLS: 0
HEADACHES: 0
TINGLING: 0

## 2020-08-04 NOTE — LETTER
[facility]  [address] [address]     August 4, 2020    Patient: Lisa Gomes   YOB: 1985   Date of Visit: 8/4/2020       To Whom It May Concern:    Lisa Gomes was seen and treated in our department on 8/4/2020.  Please excuse from work on 8/4/2020 through 8/5/2020.  Please call with any questions or concerns at [phone].    Sincerely,     Paxton Hannah P.A.-C.

## 2020-08-04 NOTE — PROGRESS NOTES
Subjective:   Lisa Gomes is a 34 y.o. 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  She states yesterday at work her symptoms greatly increased which led her to come in today.  She describes a constant dull ache to the dorsal aspect of her wrist with associated numbness and tingling in all the fingers.  She states lifting seems to aggravate the symptoms.  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  She denies any incidence of trauma.  The patient was seen years ago and diagnosed with bilateral carpal tunnel syndrome.  Yesterday she picked up a wrist splint which provided mild relief.    Review of Systems   Constitutional: Negative for chills, fever and malaise/fatigue.   Musculoskeletal: Negative for falls and neck pain.        Right wrist and hand pain which is been present for 1 year.  The pain rapidly worsened yesterday.  Denies any new trauma.  Describes it as a constant dull aching pain with occasional sharp stabbing sensations of her wrist and hand.  She also describes some numbness and tingling in the fingers.   Neurological: Negative for dizziness, tingling and headaches.       Medications:    • albuterol Aers  • busPIRone Tabs  • gabapentin Caps  • mesalamine extended-release  • traZODone Tabs    Allergies: Patient has no known allergies.    Problem List: Lisa Gomes has Chronic back pain; Anxiety; Exposure to hepatitis C; Bipolar 1 disorder (HCC); History of polycythemia; Vitamin D deficiency; Mild shortness of breath; and Colitis on their problem list.    Surgical History:  Past Surgical History:   Procedure  "Laterality Date   • ABDOMINAL EXPLORATION      laparoscopy   • WRIST EXPLORATION Left     left wrist surgery secondary to fracture       Past Social Hx: Lisa Gomes  reports that she quit smoking about 3 years ago. Her smoking use included cigarettes. She has a 0.25 pack-year smoking history. She quit smokeless tobacco use about 3 years ago. She reports current drug use. Frequency: 42.00 times per week. Drugs: Marijuana and Inhaled. She reports that she does not drink alcohol.     Past Family Hx:  Lisa Gomes family history includes Diabetes in her paternal grandmother; Heart Attack in her paternal grandfather and paternal grandmother; Thyroid in her mother.     Problem list, medications, and allergies reviewed by myself today in Epic.     Objective:     /62   Pulse 61   Temp 36.6 °C (97.8 °F)   Resp 16   Ht 1.575 m (5' 2\")   Wt 50.8 kg (112 lb)   SpO2 97%   BMI 20.49 kg/m²     Physical Exam  Constitutional:       General: She is not in acute distress.     Appearance: Normal appearance. She is not ill-appearing, toxic-appearing or diaphoretic.   HENT:      Head: Normocephalic and atraumatic.   Eyes:      Conjunctiva/sclera: Conjunctivae normal.   Neck:      Musculoskeletal: Normal range of motion. No muscular tenderness.   Cardiovascular:      Rate and Rhythm: Normal rate and regular rhythm.      Pulses: Normal pulses.      Heart sounds: Normal heart sounds.   Pulmonary:      Effort: Pulmonary effort is normal.      Breath sounds: Normal breath sounds. No wheezing.   Abdominal:      Palpations: Abdomen is soft.      Tenderness: There is no abdominal tenderness.   Musculoskeletal:      Right wrist: She exhibits normal range of motion, no tenderness, no bony tenderness, no swelling and no effusion.        Arms:       Comments: Right wrist:  No bony tenderness to palpation over the wrist hand or any of the digits.  Mild tenderness to palpation over the CMC joints.  No obvious deformities redness, swelling " or increased warmth.  Full wrist flexion/extension/ulnar/radial deviation.  Full range of motion of the hands and all digits.  Strength 5/5 to the wrist and  strength.  Full and intact sensation to all digits of the right hand.  Capillary refill less than 2 seconds.  Finkelstein's test: Negative  Positive Tinel sign.  Positive Phalen sign.     Lymphadenopathy:      Cervical: No cervical adenopathy.   Skin:     General: Skin is warm and dry.      Capillary Refill: Capillary refill takes less than 2 seconds.   Neurological:      General: No focal deficit present.      Mental Status: She is alert and oriented to person, place, and time. Mental status is at baseline.   Psychiatric:         Mood and Affect: Mood normal.         Thought Content: Thought content normal.       X-ray right wrist:  FINDINGS:  There is no evidence of fracture or dislocation. Alignment is maintained. No periosteal new bone formation or osseous erosion is identified.    IMPRESSION:  No evidence of acute fracture or dislocation.    Assessment/Plan:     Diagnosis and associated orders:   1. Right wrist tendinitis    - DX-WRIST-COMPLETE 3+ RIGHT; Future  - methylPREDNISolone (MEDROL DOSEPAK) 4 MG Tablet Therapy Pack; Follow schedule on package instructions.  Dispense: 21 Tab; Refill: 0     Comments/MDM:     • Take Medrol Dosepak as prescribed.  Take with food.  • Recommended using ibuprofen as needed for pain and inflammation.  May use Tylenol for any breakthrough pain.  • Wear wrist splint while at work and while sleeping.  • Rest wrist is much as possible elevate and ice while at home ice for 10 to 15 minutes 4 times a day.  • Recommended following up with PCP to discuss further testing for carpal tunnel syndrome.           Differential diagnosis, natural history, supportive care, and indications for immediate follow-up discussed.    Advised the patient to follow-up with the primary care physician for recheck, reevaluation, and consideration  of further management.    Please note that this dictation was created using voice recognition software. I have made reasonable attempt to correct obvious errors, but I expect that there are errors of grammar and possibly content that I did not discover before finalizing the note.    This note was electronically signed by SHANT Hannah PA-C

## 2020-09-03 ENCOUNTER — OFFICE VISIT (OUTPATIENT)
Dept: MEDICAL GROUP | Facility: CLINIC | Age: 35
End: 2020-09-03
Payer: COMMERCIAL

## 2020-09-03 VITALS
HEART RATE: 74 BPM | RESPIRATION RATE: 14 BRPM | HEIGHT: 62 IN | WEIGHT: 110.8 LBS | DIASTOLIC BLOOD PRESSURE: 78 MMHG | OXYGEN SATURATION: 98 % | SYSTOLIC BLOOD PRESSURE: 110 MMHG | BODY MASS INDEX: 20.39 KG/M2 | TEMPERATURE: 98.3 F

## 2020-09-03 DIAGNOSIS — F31.9 BIPOLAR 1 DISORDER (HCC): ICD-10-CM

## 2020-09-03 DIAGNOSIS — E55.9 VITAMIN D DEFICIENCY: ICD-10-CM

## 2020-09-03 DIAGNOSIS — M54.41 CHRONIC BILATERAL LOW BACK PAIN WITH BILATERAL SCIATICA: ICD-10-CM

## 2020-09-03 DIAGNOSIS — M54.42 CHRONIC BILATERAL LOW BACK PAIN WITH BILATERAL SCIATICA: ICD-10-CM

## 2020-09-03 DIAGNOSIS — G89.29 CHRONIC BILATERAL LOW BACK PAIN WITH BILATERAL SCIATICA: ICD-10-CM

## 2020-09-03 DIAGNOSIS — M43.17 SPONDYLOLISTHESIS AT L5-S1 LEVEL: ICD-10-CM

## 2020-09-03 DIAGNOSIS — F41.9 ANXIETY: ICD-10-CM

## 2020-09-03 DIAGNOSIS — K52.9 COLITIS: ICD-10-CM

## 2020-09-03 DIAGNOSIS — G56.03 CARPAL TUNNEL SYNDROME ON BOTH SIDES: ICD-10-CM

## 2020-09-03 DIAGNOSIS — Z86.2 HISTORY OF POLYCYTHEMIA: ICD-10-CM

## 2020-09-03 DIAGNOSIS — Z00.00 HEALTHCARE MAINTENANCE: ICD-10-CM

## 2020-09-03 PROCEDURE — 99214 OFFICE O/P EST MOD 30 MIN: CPT | Performed by: PHYSICIAN ASSISTANT

## 2020-09-03 RX ORDER — QUETIAPINE FUMARATE 100 MG/1
200 TABLET, FILM COATED ORAL
Qty: 120 TAB | Refills: 1 | Status: SHIPPED | OUTPATIENT
Start: 2020-09-03 | End: 2021-01-18

## 2020-09-03 RX ORDER — MESALAMINE 500 MG/1
1000 CAPSULE, EXTENDED RELEASE ORAL 4 TIMES DAILY
Qty: 480 CAP | Refills: 0 | Status: SHIPPED | OUTPATIENT
Start: 2020-09-03 | End: 2021-08-12

## 2020-09-03 RX ORDER — TRAZODONE HYDROCHLORIDE 50 MG/1
50 TABLET ORAL
Qty: 90 TAB | Refills: 3 | Status: SHIPPED | OUTPATIENT
Start: 2020-09-03 | End: 2021-10-07

## 2020-09-03 RX ORDER — GABAPENTIN 400 MG/1
400 CAPSULE ORAL 3 TIMES DAILY
Qty: 270 CAP | Refills: 3 | Status: SHIPPED | OUTPATIENT
Start: 2020-09-03 | End: 2021-10-23 | Stop reason: SDUPTHER

## 2020-09-03 RX ORDER — BUSPIRONE HYDROCHLORIDE 10 MG/1
10 TABLET ORAL 3 TIMES DAILY
Qty: 270 TAB | Refills: 3 | Status: SHIPPED
Start: 2020-09-03 | End: 2021-07-21

## 2020-09-03 ASSESSMENT — FIBROSIS 4 INDEX: FIB4 SCORE: 0.55

## 2020-09-03 NOTE — ASSESSMENT & PLAN NOTE
This is a chronic condition for this patient.  Currently taking gabapentin 400 mg 3 times daily to manage the pain.  Last imaging done July 2015 shows a bilateral chronic L5 pars defect with a grade 1 spondylolisthesis.  Impingement of exiting L5 nerve roots bilaterally, with right > left.  She has requested a referral to pain management to help with the pain.  She states that it changes her stride and makes it difficult for her to walk when the pain is bad.  I have placed this consult today.  We will continue to follow.

## 2020-09-03 NOTE — PROGRESS NOTES
Chief Complaint   Patient presents with   • Depression       HISTORY OF PRESENT ILLNESS: Patient is a 34 y.o. female established patient who presents today to discuss the following issues:    Bipolar 1 disorder (CMS-[facility])  Chronic condition for this patient with a current depressive episode.  States her stress level has been increasing due to her fiancé having stage IV neck cancer.  She states that she feels that the medications that she is on are not working.  States she has episodes where she cries for no reason and then it is like somebody flips a switch and she is angry.  We are going to try a course of Seroquel 200 mg nightly.  She will take this for few weeks and let me know how she is doing on it.  We will adjust this dose if necessary.  We have discussed safety mechanisms and places she can reach out to for help should her depression get bad enough where she feels hopeless.  She understands and has a suicide hotline number stuck on her refrigerator door.  She is also having weekly counseling sessions.  She understands that should she need to see me she can come in and make an appointment at any time.  She has not had any manic episodes recently. We will continue to monitor her closely.     Vitamin D deficiency  Is a chronic condition for this patient is been over a year since she had her levels checked.  We will order a vitamin D level today.  She is not currently taking any supplementation.  We will follow-up with her with the results and add supplementation as necessary.    History of polycythemia  This is a chronic condition for this patient.  She has not had CBC done in over a year. Last CBC done May 2019 had all indices within normal limits. Her total iron and iron binding were within normal limits. We will do a new CBC to continue to monitor this condition.    Spondylolisthesis at L5-S1 level  This is a chronic condition for this patient.  Currently taking gabapentin 400 mg 3 times daily to manage the  pain.  Last imaging done July 2015 shows a bilateral chronic L5 pars defect with a grade 1 spondylolisthesis.  Impingement of exiting L5 nerve roots bilaterally, with right > left.  She has requested a referral to pain management to help with the pain.  She states that it changes her stride and makes it difficult for her to walk when the pain is bad.  I have placed this consult today.  We will continue to follow.    Anxiety  Chronic condition currently well controlled on buspirone 10 mg 3 times daily.  She states that recently she has occasionally had increased anxiety for which she takes 1 extra tablet on some days.  With the adjustment to her other medications I have asked her not to take any extra tablets.  I explained that we need to see how well the new medication is going to work before we make any changes to any of the other medications.  She verbalized understanding and is agreeable with this plan of action.  We will continue to follow.    Colitis  This is a chronic condition for this patient that was diagnosed 5 years ago.  She was on Pentasa 500 mg 2 tablets 4 times daily in the past.  In the last 10 days she has started another flare.  I will restart her Pentasa at the previous dose.  We will continue to follow.    Healthcare maintenance  Patient is due for routine lab work.  Last lab work done a year and a half ago.  We will order routine labs today.  Patient is to come back in 2 months for a well woman exam and flu shot.      Patient Active Problem List    Diagnosis Date Noted   • Spondylolisthesis at L5-S1 level 09/03/2020   • Carpal tunnel syndrome on both sides 09/03/2020   • Healthcare maintenance 09/03/2020   • Colitis 01/16/2020   • Mild shortness of breath 02/12/2019   • Bipolar 1 disorder (HCC) 07/24/2017   • History of polycythemia 07/24/2017   • Vitamin D deficiency 07/24/2017   • Anxiety 03/05/2015   • Exposure to hepatitis C 03/05/2015       Allergies:Patient has no known  allergies.    Current Outpatient Medications   Medication Sig Dispense Refill   • QUEtiapine (SEROQUEL) 100 MG Tab Take 2 Tabs by mouth every bedtime. 120 Tab 1   • mesalamine extended-release (PENTASA) 500 MG capsule Take 2 Caps by mouth 4 times a day. 480 Cap 0   • busPIRone (BUSPAR) 10 MG Tab tablet Take 1 Tab by mouth 3 times a day. 270 Tab 3   • gabapentin (NEURONTIN) 400 MG Cap Take 1 Cap by mouth 3 times a day. 270 Cap 3   • traZODone (DESYREL) 50 MG Tab Take 1 Tab by mouth every bedtime. 90 Tab 3     No current facility-administered medications for this visit.        No visits with results within 6 Month(s) from this visit.   Latest known visit with results is:   Non-Provider Visit on 2019   Component Date Value Ref Range Status   • POC Breathalizer 2019 0.00  0.00 - 0.01 Percent Final   • POC Urine Drug Screen Comment 2019 NEGATIVE   Final   • Internal Control Positive 2019 Valid   Final   • Internal Control Negative 2019 Valid   Final   ]    The ASCVD Risk score (Coward NATASHA Jr., et al., 2013) failed to calculate for the following reasons:    The 2013 ASCVD risk score is only valid for ages 40 to 79    Social History     Tobacco Use   • Smoking status: Former Smoker     Packs/day: 0.50     Years: 0.50     Pack years: 0.25     Types: Cigarettes     Quit date: 2016     Years since quittin.0   • Smokeless tobacco: Former User     Quit date: 2016   Substance Use Topics   • Alcohol use: No     Alcohol/week: 0.0 oz   • Drug use: Yes     Frequency: 42.0 times per week     Types: Marijuana, Inhaled     Comment: smokes 6 bowls per day, 4 inhalations per bowl.        Family Status   Relation Name Status   • PGFa  (Not Specified)   • PGMo  (Not Specified)   • Mo  (Not Specified)     Family History   Problem Relation Age of Onset   • Heart Attack Paternal Grandfather    • Heart Attack Paternal Grandmother    • Diabetes Paternal Grandmother    • Thyroid Mother    • Cancer Mother   "       thyroid   • Hypertension Father    • Alcohol abuse Brother        No LMP recorded.    Health Maintenance Summary                PAP SMEAR Overdue 7/27/2020      Done 7/27/2017 THINPREP PAP W/HPV AND CTNG     Patient has more history with this topic...    IMM INFLUENZA Overdue 9/1/2020     IMM DTaP/Tdap/Td Vaccine Next Due 7/24/2027      Done 7/24/2017 Imm Admin: Tdap Vaccine           Review of Systems:   Constitutional: Negative for fever, chills, weight change, fatigue, loss of appetite.  HNT: Negative for nosebleeds, congestion, odynophagia, sore throat or changes in taste.    Eyes: Negative for vision changes.   Ears: Negative for recent changes in hearing, pain or discharge.  Neck: Negative for pain, swelling, lumps or goiter.  Respiratory: Negative for cough, sputum production, shortness of breath and wheezing.    Cardiovascular: Negative for chest pain, palpitations, orthopnea and leg swelling.   Gastrointestinal: Positive for diarrhea.  Negative for constipation, heartburn, dysphagia, nausea, vomiting or abdominal pain.   Genitourinary: Negative for dysuria, urgency and frequency.   Musculoskeletal: Positive for low back and bilateral wrist pain.  Negative for myalgias.  Skin: Negative for skin, hair or nail changes, rash, itching.   Neurological: Negative for dizziness, tingling, tremors, sensory change, gait/coordination changes, focal weakness and headaches.   Endo/Heme/Allergies: Does not bruise/bleed easily.   Psychiatric/Behavioral: Positive for depression, anxiety.  Negative for suicidal ideas and memory loss.  The patient is anxious and has insomnia.        Exam:  /78 (BP Location: Left arm, Patient Position: Sitting, BP Cuff Size: Adult)   Pulse 74   Temp 36.8 °C (98.3 °F) (Temporal)   Resp 14   Ht 1.575 m (5' 2\")   Wt 50.3 kg (110 lb 12.8 oz)   SpO2 98%  Body mass index is 20.27 kg/m².  General:  Well nourished, well developed female. No apparent distress.  Eyes: EOM intact, " PERRL, conjunctiva non-injected, sclera non-icteric.  Ears: Lissette pinnae, external auditory canals, TM pearly gray with normal light reflex bilaterally.  Neck: Supple with no cervical lymphadenopathy, JVD, palpable thyroid nodules or carotid bruits.  Pulmonary: Clear to ausculation bilaterally. Normal effort. No rales, ronchi, or wheezing.  Cardiovascular: Regular rate and rhythm without murmur, rub or gallop.   Abdomen:  Soft, non-distended, diffusely tender with normal bowel sounds. No CVA tenderness  Extremities: Full range of motion. Warm and well perfused with no edema.  Skin: Intact with no obvious rashes or lesions.  Neuro: Cranial nerves I-XII grossly intact.  Psych: Alert and oriented x 3.  Appropriately dressed.  Depressed mood, anxious, tearful in the office.      Assessment/Plan:  1. Spondylolisthesis at L5-S1 level  REFERRAL TO PAIN MANAGEMENT    gabapentin (NEURONTIN) 400 MG Cap   2. Bipolar 1 disorder (HCC)  QUEtiapine (SEROQUEL) 100 MG Tab    traZODone (DESYREL) 50 MG Tab   3. Anxiety  busPIRone (BUSPAR) 10 MG Tab tablet   4. Carpal tunnel syndrome on both sides     5. Vitamin D deficiency  VITAMIN D,25 HYDROXY   6. History of polycythemia  CBC WITH DIFFERENTIAL   7. Healthcare maintenance  Comp Metabolic Panel    Lipid Profile   8. Colitis  mesalamine extended-release (PENTASA) 500 MG capsule   9. Chronic bilateral low back pain with bilateral sciatica         Reviewed risks and benefits of treatment plan. Patient verbally agrees to plan of care.     Return in about 3 months (around 12/3/2020) for wellwoman exam.    Please note that this dictation was created using voice recognition software. I have made every reasonable attempt to correct obvious errors, but I expect that there are errors of grammar and possibly content that I did not discover before finalizing the note.

## 2020-09-03 NOTE — ASSESSMENT & PLAN NOTE
Patient is due for routine lab work.  Last lab work done a year and a half ago.  We will order routine labs today.  Patient is to come back in 2 months for a well woman exam and flu shot.

## 2020-09-03 NOTE — ASSESSMENT & PLAN NOTE
This is a chronic condition for this patient that was diagnosed 5 years ago.  She was on Pentasa 500 mg 2 tablets 4 times daily in the past.  In the last 10 days she has started another flare.  I will restart her Pentasa at the previous dose.  We will continue to follow.

## 2020-09-03 NOTE — ASSESSMENT & PLAN NOTE
This is a chronic condition for this patient.  She has not had CBC done in over a year. Last CBC done May 2019 had all indices within normal limits. Her total iron and iron binding were within normal limits. We will do a new CBC to continue to monitor this condition.

## 2020-09-03 NOTE — ASSESSMENT & PLAN NOTE
Chronic condition currently well controlled on buspirone 10 mg 3 times daily.  She states that recently she has occasionally had increased anxiety for which she takes 1 extra tablet on some days.  With the adjustment to her other medications I have asked her not to take any extra tablets.  I explained that we need to see how well the new medication is going to work before we make any changes to any of the other medications.  She verbalized understanding and is agreeable with this plan of action.  We will continue to follow.

## 2020-09-03 NOTE — ASSESSMENT & PLAN NOTE
Chronic condition for this patient with a current depressive episode.  States her stress level has been increasing due to her fiancé having stage IV neck cancer.  She states that she feels that the medications that she is on are not working.  States she has episodes where she cries for no reason and then it is like somebody flips a switch and she is angry.  We are going to try a course of Seroquel 200 mg nightly.  She will take this for few weeks and let me know how she is doing on it.  We will adjust this dose if necessary.  We have discussed safety mechanisms and places she can reach out to for help should her depression get bad enough where she feels hopeless.  She understands and has a suicide hotline number stuck on her refrigerator door.  She is also having weekly counseling sessions.  She understands that should she need to see me she can come in and make an appointment at any time.  She has not had any manic episodes recently. We will continue to monitor her closely.

## 2020-09-03 NOTE — ASSESSMENT & PLAN NOTE
Is a chronic condition for this patient is been over a year since she had her levels checked.  We will order a vitamin D level today.  She is not currently taking any supplementation.  We will follow-up with her with the results and add supplementation as necessary.

## 2020-10-20 ENCOUNTER — HOSPITAL ENCOUNTER (OUTPATIENT)
Dept: RADIOLOGY | Facility: MEDICAL CENTER | Age: 35
End: 2020-10-20
Attending: PAIN MEDICINE
Payer: COMMERCIAL

## 2020-10-20 DIAGNOSIS — M54.16 LUMBAR RADICULOPATHY: ICD-10-CM

## 2020-10-20 PROCEDURE — 72148 MRI LUMBAR SPINE W/O DYE: CPT

## 2020-10-20 PROCEDURE — 72100 X-RAY EXAM L-S SPINE 2/3 VWS: CPT

## 2020-11-13 ENCOUNTER — TELEPHONE (OUTPATIENT)
Dept: MEDICAL GROUP | Facility: CLINIC | Age: 35
End: 2020-11-13

## 2020-11-13 NOTE — TELEPHONE ENCOUNTER
VOICEMAIL  1. Caller Name: Lisa Gomes                        Call Back Number: [phone] (home)       2. Message: Pt called and asked if she could have a referral to a different office maybe one in [place] as she feels she is not getting the care she needs at the current one. She would like you to call her back     3. Patient approves office to leave a detailed voicemail/MyChart message: yes

## 2020-11-14 NOTE — TELEPHONE ENCOUNTER
Called patient and left a message to let her know I need to know what specialty she needs a new referral for.  She will call back with the specifics and I can place a new referral.

## 2021-08-12 DIAGNOSIS — K52.9 COLITIS: ICD-10-CM

## 2021-08-13 RX ORDER — MESALAMINE 500 MG/1
CAPSULE ORAL
Qty: 120 CAPSULE | Refills: 0 | Status: SHIPPED | OUTPATIENT
Start: 2021-08-13 | End: 2021-10-23 | Stop reason: SDUPTHER

## 2021-10-07 ENCOUNTER — OFFICE VISIT (OUTPATIENT)
Dept: MEDICAL GROUP | Facility: CLINIC | Age: 36
End: 2021-10-07
Payer: COMMERCIAL

## 2021-10-07 VITALS
BODY MASS INDEX: 23.37 KG/M2 | SYSTOLIC BLOOD PRESSURE: 106 MMHG | WEIGHT: 127 LBS | DIASTOLIC BLOOD PRESSURE: 60 MMHG | OXYGEN SATURATION: 97 % | TEMPERATURE: 98.6 F | HEART RATE: 86 BPM | HEIGHT: 62 IN

## 2021-10-07 DIAGNOSIS — K52.9 COLITIS: ICD-10-CM

## 2021-10-07 DIAGNOSIS — Z86.2 HISTORY OF POLYCYTHEMIA: ICD-10-CM

## 2021-10-07 DIAGNOSIS — E55.9 VITAMIN D DEFICIENCY: ICD-10-CM

## 2021-10-07 DIAGNOSIS — E78.2 MIXED HYPERLIPIDEMIA: ICD-10-CM

## 2021-10-07 DIAGNOSIS — F41.9 ANXIETY: ICD-10-CM

## 2021-10-07 PROCEDURE — 99214 OFFICE O/P EST MOD 30 MIN: CPT | Performed by: PHYSICIAN ASSISTANT

## 2021-10-07 RX ORDER — BUPROPION HYDROCHLORIDE 150 MG/1
150 TABLET ORAL EVERY MORNING
Qty: 30 TABLET | Refills: 1 | Status: SHIPPED | OUTPATIENT
Start: 2021-10-07 | End: 2021-12-01 | Stop reason: SDUPTHER

## 2021-10-08 ENCOUNTER — HOSPITAL ENCOUNTER (OUTPATIENT)
Facility: MEDICAL CENTER | Age: 36
End: 2021-10-08
Attending: PHYSICIAN ASSISTANT
Payer: COMMERCIAL

## 2021-10-08 DIAGNOSIS — K52.9 COLITIS: ICD-10-CM

## 2021-10-08 DIAGNOSIS — E55.9 VITAMIN D DEFICIENCY: ICD-10-CM

## 2021-10-08 DIAGNOSIS — Z13.220 SCREENING FOR CHOLESTEROL LEVEL: ICD-10-CM

## 2021-10-08 DIAGNOSIS — Z86.2 HISTORY OF POLYCYTHEMIA: ICD-10-CM

## 2021-10-08 PROCEDURE — 85025 COMPLETE CBC W/AUTO DIFF WBC: CPT

## 2021-10-08 PROCEDURE — 80061 LIPID PANEL: CPT

## 2021-10-08 PROCEDURE — 82306 VITAMIN D 25 HYDROXY: CPT

## 2021-10-08 PROCEDURE — 80053 COMPREHEN METABOLIC PANEL: CPT

## 2021-10-09 LAB
25(OH)D3 SERPL-MCNC: 28 NG/ML (ref 30–100)
ALBUMIN SERPL BCP-MCNC: 4.9 G/DL (ref 3.2–4.9)
ALBUMIN/GLOB SERPL: 1.8 G/DL
ALP SERPL-CCNC: 59 U/L (ref 30–99)
ALT SERPL-CCNC: 13 U/L (ref 2–50)
ANION GAP SERPL CALC-SCNC: 11 MMOL/L (ref 7–16)
AST SERPL-CCNC: 18 U/L (ref 12–45)
BASOPHILS # BLD AUTO: 1.2 % (ref 0–1.8)
BASOPHILS # BLD: 0.06 K/UL (ref 0–0.12)
BILIRUB SERPL-MCNC: 0.5 MG/DL (ref 0.1–1.5)
BUN SERPL-MCNC: 16 MG/DL (ref 8–22)
CALCIUM SERPL-MCNC: 9.8 MG/DL (ref 8.5–10.5)
CHLORIDE SERPL-SCNC: 105 MMOL/L (ref 96–112)
CHOLEST SERPL-MCNC: 279 MG/DL (ref 100–199)
CO2 SERPL-SCNC: 23 MMOL/L (ref 20–33)
CREAT SERPL-MCNC: 0.73 MG/DL (ref 0.5–1.4)
EOSINOPHIL # BLD AUTO: 0.69 K/UL (ref 0–0.51)
EOSINOPHIL NFR BLD: 13.5 % (ref 0–6.9)
ERYTHROCYTE [DISTWIDTH] IN BLOOD BY AUTOMATED COUNT: 40.2 FL (ref 35.9–50)
GLOBULIN SER CALC-MCNC: 2.8 G/DL (ref 1.9–3.5)
GLUCOSE SERPL-MCNC: 98 MG/DL (ref 65–99)
HCT VFR BLD AUTO: 41.6 % (ref 37–47)
HDLC SERPL-MCNC: 49 MG/DL
HGB BLD-MCNC: 14.2 G/DL (ref 12–16)
IMM GRANULOCYTES # BLD AUTO: 0.01 K/UL (ref 0–0.11)
IMM GRANULOCYTES NFR BLD AUTO: 0.2 % (ref 0–0.9)
LDLC SERPL CALC-MCNC: 192 MG/DL
LYMPHOCYTES # BLD AUTO: 1.43 K/UL (ref 1–4.8)
LYMPHOCYTES NFR BLD: 27.9 % (ref 22–41)
MCH RBC QN AUTO: 30 PG (ref 27–33)
MCHC RBC AUTO-ENTMCNC: 34.1 G/DL (ref 33.6–35)
MCV RBC AUTO: 87.9 FL (ref 81.4–97.8)
MONOCYTES # BLD AUTO: 0.32 K/UL (ref 0–0.85)
MONOCYTES NFR BLD AUTO: 6.2 % (ref 0–13.4)
NEUTROPHILS # BLD AUTO: 2.62 K/UL (ref 2–7.15)
NEUTROPHILS NFR BLD: 51 % (ref 44–72)
NRBC # BLD AUTO: 0 K/UL
NRBC BLD-RTO: 0 /100 WBC
PLATELET # BLD AUTO: 225 K/UL (ref 164–446)
PMV BLD AUTO: 12.5 FL (ref 9–12.9)
POTASSIUM SERPL-SCNC: 4.1 MMOL/L (ref 3.6–5.5)
PROT SERPL-MCNC: 7.7 G/DL (ref 6–8.2)
RBC # BLD AUTO: 4.73 M/UL (ref 4.2–5.4)
SODIUM SERPL-SCNC: 139 MMOL/L (ref 135–145)
TRIGL SERPL-MCNC: 192 MG/DL (ref 0–149)
WBC # BLD AUTO: 5.1 K/UL (ref 4.8–10.8)

## 2021-10-12 PROBLEM — E78.2 MIXED HYPERLIPIDEMIA: Status: ACTIVE | Noted: 2021-10-12

## 2021-10-12 ASSESSMENT — LIFESTYLE VARIABLES: SUBSTANCE_ABUSE: 0

## 2021-10-12 ASSESSMENT — ENCOUNTER SYMPTOMS
INSOMNIA: 0
HALLUCINATIONS: 0
RESPIRATORY NEGATIVE: 1
NEUROLOGICAL NEGATIVE: 1
MUSCULOSKELETAL NEGATIVE: 1
MEMORY LOSS: 0
GASTROINTESTINAL NEGATIVE: 1
CARDIOVASCULAR NEGATIVE: 1
NERVOUS/ANXIOUS: 1
CONSTITUTIONAL NEGATIVE: 1
EYES NEGATIVE: 1
DEPRESSION: 1

## 2021-10-12 ASSESSMENT — VISUAL ACUITY: OU: 1

## 2021-10-12 NOTE — PROGRESS NOTES
Subjective   Christiane Gomes is a 35 y.o. female who presents with Medication Follow-up (Annual) and Medication Refill (Trazodone, )    1. Vitamin D deficiency  Patient here for her annul exam for prescription refills. Due for routine labs. Is not currently taking any supplementation. Will follow up in two weeks with results.  - VITAMIN D,25 HYDROXY; Future    2. History of polycythemia  Chronic condition. Last CBC and iron studies were within normal limits. Due for annual screening.  - CBC WITH DIFFERENTIAL; Future    3. Colitis  Chronic condition. Currently takes Pentaza 4 times a day. Is asking about something that she can take that is not so often. No alternative available currently.  - Comp Metabolic Panel; Future    4. Anxiety  The patient's current medical issue is well controlled on the current therapy with no new symptoms or worsening. Is requesting refill today.   - buPROPion (WELLBUTRIN XL) 150 MG XL tablet; Take 1 Tablet by mouth every morning.  Dispense: 30 Tablet; Refill: 1    5. Mixed hyperlipidemia  Chronic condition. Last labs 2 years ago showed condition not controlled. Not currently on medications. Due for repeat labs.  - Lipid Profile; Future    Past Medical History:  3/5/2015: Anxiety  2015: Bipolar 1 disorder, mixed, moderate (HCC)  2019: Carpal tunnel syndrome on both sides  3/5/2015: Chronic back pain  2015: Chronic post-traumatic stress disorder (PTSD)  2017: Colitis  No date: Depression  No date: Head ache  No date: Hyperlipidemia      Comment:  no medication  Past Surgical History:  No date: ABDOMINAL EXPLORATION      Comment:  laparoscopy  No date: WRIST EXPLORATION; Left      Comment:  left wrist surgery secondary to fracture  Social History    Tobacco Use      Smoking status: Former Smoker        Packs/day: 0.50        Years: 0.50        Pack years: .25        Types: Cigarettes        Quit date: 2016        Years since quittin.1      Smokeless tobacco: Former User        Quit  date: 8/27/2016    Vaping Use      Vaping Use: Every day        Start date: 1/3/2019        Substances: THC        Devices: Pre-filled or refillable cartridge    Alcohol use: No      Alcohol/week: 0.0 oz    Drug use: Yes      Frequency: 42.0 times per week      Types: Marijuana, Inhaled      Comment: smokes 6 bowls per day, 4 inhalations per bowl.     Review of patient's family history indicates:  Problem: Heart Attack      Relation: Paternal Grandfather          Age of Onset: (Not Specified)  Problem: Heart Attack      Relation: Paternal Grandmother          Age of Onset: (Not Specified)  Problem: Diabetes      Relation: Paternal Grandmother          Age of Onset: (Not Specified)  Problem: Thyroid      Relation: Mother          Age of Onset: (Not Specified)  Problem: Cancer      Relation: Mother          Age of Onset: (Not Specified)          Comment: thyroid  Problem: Hypertension      Relation: Father          Age of Onset: (Not Specified)  Problem: Alcohol abuse      Relation: Brother          Age of Onset: (Not Specified)      Current Outpatient Medications: •  buPROPion (WELLBUTRIN XL) 150 MG XL tablet, Take 1 Tablet by mouth every morning., Disp: 30 Tablet, Rfl: 1•  busPIRone (BUSPAR) 10 MG Tab tablet, TAKE ONE TABLET BY MOUTH THREE TIMES A DAY (BUSPAR), Disp: 90 Tablet, Rfl: 0•  PENTASA 500 MG capsule, TAKE TWO CAPSULES BY MOUTH FOUR TIMES A DAY, Disp: 120 Capsule, Rfl: 0•  QUEtiapine (SEROQUEL) 100 MG Tab, TAKE TWO TABLETS BY MOUTH AT BEDTIME, Disp: 180 tablet, Rfl: 1•  gabapentin (NEURONTIN) 400 MG Cap, Take 1 Cap by mouth 3 times a day., Disp: 270 Cap, Rfl: 3    Patient was instructed on the use of medications, either prescriptions or OTC and informed on when the appropriate follow up time period should be. In addition, patient was also instructed that should any acute worsening occur that they should notify this clinic asap or call 911.      Review of Systems   Constitutional: Negative.    HENT:  "Negative.    Eyes: Negative.    Respiratory: Negative.    Cardiovascular: Negative.    Gastrointestinal: Negative.    Genitourinary: Negative.    Musculoskeletal: Negative.    Skin: Negative.    Neurological: Negative.    Endo/Heme/Allergies: Negative.    Psychiatric/Behavioral: Positive for depression. Negative for hallucinations, memory loss, substance abuse and suicidal ideas. The patient is nervous/anxious. The patient does not have insomnia.      Objective     /60 (BP Location: Right arm, Patient Position: Sitting, BP Cuff Size: Adult)   Pulse 86   Temp 37 °C (98.6 °F) (Temporal)   Ht 1.575 m (5' 2\")   Wt 57.6 kg (127 lb)   LMP 09/30/2021   SpO2 97%   Breastfeeding No   BMI 23.23 kg/m²      Physical Exam  Vitals and nursing note reviewed.   Constitutional:       Appearance: Normal appearance. She is well-developed and well-groomed.   HENT:      Head: Normocephalic and atraumatic.      Nose: Nose normal.      Mouth/Throat:      Lips: Pink. No lesions.      Mouth: Mucous membranes are moist.   Eyes:      General: Lids are normal. Vision grossly intact. Gaze aligned appropriately.      Extraocular Movements: Extraocular movements intact.      Conjunctiva/sclera: Conjunctivae normal.      Pupils: Pupils are equal, round, and reactive to light.   Neck:      Thyroid: No thyromegaly.      Vascular: No carotid bruit or JVD.      Trachea: Trachea and phonation normal.   Cardiovascular:      Rate and Rhythm: Normal rate and regular rhythm.      Heart sounds: Normal heart sounds. No murmur heard.   No friction rub. No gallop.    Pulmonary:      Effort: Pulmonary effort is normal.      Breath sounds: Normal breath sounds. No wheezing, rhonchi or rales.   Musculoskeletal:         General: Normal range of motion.      Cervical back: Normal range of motion and neck supple.      Right lower leg: No edema.      Left lower leg: No edema.   Lymphadenopathy:      Cervical: No cervical adenopathy.   Skin:     General: " Skin is warm and dry.      Capillary Refill: Capillary refill takes less than 2 seconds.      Findings: No lesion or rash.   Neurological:      Mental Status: She is alert and oriented to person, place, and time.      Cranial Nerves: Cranial nerves are intact.   Psychiatric:         Attention and Perception: Attention and perception normal.         Mood and Affect: Mood and affect normal.         Speech: Speech normal.         Behavior: Behavior normal. Behavior is cooperative.         Thought Content: Thought content normal.         Judgment: Judgment normal.       Assessment & Plan      1. Vitamin D deficiency  - VITAMIN D,25 HYDROXY; Future    2. History of polycythemia  - CBC WITH DIFFERENTIAL; Future    3. Colitis  - Comp Metabolic Panel; Future    4. Anxiety  - buPROPion (WELLBUTRIN XL) 150 MG XL tablet; Take 1 Tablet by mouth every morning.  Dispense: 30 Tablet; Refill: 1    5. Mixed hyperlipidemia  - Lipid Profile; Future

## 2021-10-21 ENCOUNTER — OFFICE VISIT (OUTPATIENT)
Dept: MEDICAL GROUP | Facility: CLINIC | Age: 36
End: 2021-10-21
Payer: COMMERCIAL

## 2021-10-21 VITALS
TEMPERATURE: 97.8 F | HEIGHT: 62 IN | BODY MASS INDEX: 23 KG/M2 | SYSTOLIC BLOOD PRESSURE: 110 MMHG | DIASTOLIC BLOOD PRESSURE: 60 MMHG | HEART RATE: 80 BPM | WEIGHT: 125 LBS | OXYGEN SATURATION: 96 %

## 2021-10-21 DIAGNOSIS — E55.9 VITAMIN D DEFICIENCY: ICD-10-CM

## 2021-10-21 DIAGNOSIS — E78.2 MIXED HYPERLIPIDEMIA: ICD-10-CM

## 2021-10-21 PROCEDURE — 99214 OFFICE O/P EST MOD 30 MIN: CPT | Performed by: PHYSICIAN ASSISTANT

## 2021-10-21 RX ORDER — ROSUVASTATIN CALCIUM 10 MG/1
10 TABLET, COATED ORAL EVERY EVENING
Qty: 90 TABLET | Refills: 3 | Status: SHIPPED | OUTPATIENT
Start: 2021-10-21 | End: 2022-04-22 | Stop reason: SDUPTHER

## 2021-10-21 ASSESSMENT — FIBROSIS 4 INDEX: FIB4 SCORE: 0.78

## 2021-10-23 DIAGNOSIS — M43.17 SPONDYLOLISTHESIS AT L5-S1 LEVEL: ICD-10-CM

## 2021-10-23 DIAGNOSIS — K52.9 COLITIS: ICD-10-CM

## 2021-10-23 DIAGNOSIS — F41.9 ANXIETY: ICD-10-CM

## 2021-10-23 RX ORDER — GABAPENTIN 400 MG/1
400 CAPSULE ORAL 3 TIMES DAILY
Qty: 90 CAPSULE | Refills: 0 | Status: SHIPPED | OUTPATIENT
Start: 2021-10-23 | End: 2021-10-27

## 2021-10-23 RX ORDER — MESALAMINE 500 MG/1
1000 CAPSULE, EXTENDED RELEASE ORAL 4 TIMES DAILY
Qty: 240 CAPSULE | Refills: 0 | Status: SHIPPED | OUTPATIENT
Start: 2021-10-23 | End: 2021-11-22

## 2021-10-23 RX ORDER — BUSPIRONE HYDROCHLORIDE 10 MG/1
TABLET ORAL
Qty: 90 TABLET | Refills: 0 | Status: SHIPPED | OUTPATIENT
Start: 2021-10-23 | End: 2021-11-27 | Stop reason: SDUPTHER

## 2021-10-24 NOTE — PROGRESS NOTES
Received call over weekend her meds had not been sent in yet. I put 30 day refills on gabapentin, buspar, and pentasa.

## 2021-11-04 ASSESSMENT — ENCOUNTER SYMPTOMS
PSYCHIATRIC NEGATIVE: 1
GASTROINTESTINAL NEGATIVE: 1
MUSCULOSKELETAL NEGATIVE: 1
EYES NEGATIVE: 1
CARDIOVASCULAR NEGATIVE: 1
CONSTITUTIONAL NEGATIVE: 1
RESPIRATORY NEGATIVE: 1
NEUROLOGICAL NEGATIVE: 1

## 2021-11-04 ASSESSMENT — VISUAL ACUITY: OU: 1

## 2021-11-04 NOTE — PROGRESS NOTES
Subjective   Christiane Gomes is a 35 y.o. female who presents with Results (labs)    1. Mixed hyperlipidemia  She is here today to follow up with lab results. Lipid panel has been elevated for the last 6 years without treatment. Cannot control with lifestyle modifications. Total cholesterol 279, triglycerides 192, HDL 49, . Will start on rosuvastatin and recheck labs in 6 months.  - rosuvastatin (CRESTOR) 10 MG Tab; Take 1 Tablet by mouth every evening.  Dispense: 90 Tablet; Refill: 3  - Lipid Profile; Future    2. Vitamin D deficiency  Chronic condition. Was not taking any supplementation. Current level is 28. Have recommended starting vitamin D3 2000 units daily. Will repeat labs in 6 months.  - VITAMIN D,25 HYDROXY; Future    Past Medical History:  3/5/2015: Anxiety  2015: Bipolar 1 disorder, mixed, moderate (HCC)  2019: Carpal tunnel syndrome on both sides  3/5/2015: Chronic back pain  2015: Chronic post-traumatic stress disorder (PTSD)  2017: Colitis  No date: Depression  No date: Head ache  No date: Hyperlipidemia      Comment:  no medication  Past Surgical History:  No date: ABDOMINAL EXPLORATION      Comment:  laparoscopy  No date: WRIST EXPLORATION; Left      Comment:  left wrist surgery secondary to fracture  Social History    Tobacco Use      Smoking status: Former Smoker        Packs/day: 0.50        Years: 0.50        Pack years: .25        Types: Cigarettes        Quit date: 2016        Years since quittin.1      Smokeless tobacco: Former User        Quit date: 2016    Vaping Use      Vaping Use: Every day        Start date: 1/3/2019        Substances: THC        Devices: Pre-filled or refillable cartridge    Alcohol use: No      Alcohol/week: 0.0 oz    Drug use: Yes      Frequency: 42.0 times per week      Types: Marijuana, Inhaled      Comment: smokes 6 bowls per day, 4 inhalations per bowl.     Review of patient's family history indicates:  Problem: Heart Attack      Relation:  Paternal Grandfather          Age of Onset: (Not Specified)  Problem: Heart Attack      Relation: Paternal Grandmother          Age of Onset: (Not Specified)  Problem: Diabetes      Relation: Paternal Grandmother          Age of Onset: (Not Specified)  Problem: Thyroid      Relation: Mother          Age of Onset: (Not Specified)  Problem: Cancer      Relation: Mother          Age of Onset: (Not Specified)          Comment: thyroid  Problem: Hypertension      Relation: Father          Age of Onset: (Not Specified)  Problem: Alcohol abuse      Relation: Brother          Age of Onset: (Not Specified)      Current Outpatient Medications: •  rosuvastatin (CRESTOR) 10 MG Tab, Take 1 Tablet by mouth every evening., Disp: 90 Tablet, Rfl: 3•  buPROPion (WELLBUTRIN XL) 150 MG XL tablet, Take 1 Tablet by mouth every morning., Disp: 30 Tablet, Rfl: 1•  QUEtiapine (SEROQUEL) 100 MG Tab, TAKE TWO TABLETS BY MOUTH AT BEDTIME, Disp: 180 tablet, Rfl: 1•  gabapentin (NEURONTIN) 400 MG Cap, Take 1 Capsule by mouth 3 times a day., Disp: 270 Capsule, Rfl: 3•  mesalamine extended-release (PENTASA) 500 MG capsule, Take 2 Capsules by mouth 4 times a day for 30 days., Disp: 240 Capsule, Rfl: 0•  busPIRone (BUSPAR) 10 MG Tab tablet, TAKE ONE TABLET BY MOUTH THREE TIMES A DAY (BUSPAR), Disp: 90 Tablet, Rfl: 0    Patient was instructed on the use of medications, either prescriptions or OTC and informed on when the appropriate follow up time period should be. In addition, patient was also instructed that should any acute worsening occur that they should notify this clinic asap or call 911.      Review of Systems   Constitutional: Negative.    HENT: Negative.    Eyes: Negative.    Respiratory: Negative.    Cardiovascular: Negative.    Gastrointestinal: Negative.    Genitourinary: Negative.    Musculoskeletal: Negative.    Skin: Negative.    Neurological: Negative.    Endo/Heme/Allergies: Negative.    Psychiatric/Behavioral: Negative.   "    Objective     /60 (BP Location: Right arm, Patient Position: Sitting, BP Cuff Size: Adult)   Pulse 80   Temp 36.6 °C (97.8 °F) (Temporal)   Ht 1.575 m (5' 2\")   Wt 56.7 kg (125 lb)   LMP 09/30/2021   SpO2 96%   BMI 22.86 kg/m²      Physical Exam  Vitals and nursing note reviewed.   Constitutional:       Appearance: Normal appearance. She is well-developed and well-groomed.   HENT:      Head: Normocephalic and atraumatic.      Nose: Nose normal.      Mouth/Throat:      Lips: Pink. No lesions.      Mouth: Mucous membranes are moist.   Eyes:      General: Lids are normal. Vision grossly intact. Gaze aligned appropriately.      Extraocular Movements: Extraocular movements intact.      Conjunctiva/sclera: Conjunctivae normal.      Pupils: Pupils are equal, round, and reactive to light.   Neck:      Thyroid: No thyromegaly.      Vascular: No carotid bruit or JVD.      Trachea: Trachea and phonation normal.   Cardiovascular:      Rate and Rhythm: Normal rate and regular rhythm.      Heart sounds: Normal heart sounds. No murmur heard.   No friction rub. No gallop.    Pulmonary:      Effort: Pulmonary effort is normal.      Breath sounds: Normal breath sounds. No wheezing, rhonchi or rales.   Musculoskeletal:         General: Normal range of motion.      Cervical back: Normal range of motion and neck supple.      Right lower leg: No edema.      Left lower leg: No edema.   Lymphadenopathy:      Cervical: No cervical adenopathy.   Skin:     General: Skin is warm and dry.      Capillary Refill: Capillary refill takes less than 2 seconds.      Findings: No lesion or rash.   Neurological:      Mental Status: She is alert and oriented to person, place, and time.      Cranial Nerves: Cranial nerves are intact.   Psychiatric:         Attention and Perception: Attention and perception normal.         Mood and Affect: Mood and affect normal.         Speech: Speech normal.         Behavior: Behavior normal. Behavior is " cooperative.         Thought Content: Thought content normal.         Judgment: Judgment normal.       Assessment & Plan      1. Mixed hyperlipidemia  - rosuvastatin (CRESTOR) 10 MG Tab; Take 1 Tablet by mouth every evening.  Dispense: 90 Tablet; Refill: 3  - Lipid Profile; Future    2. Vitamin D deficiency  - VITAMIN D,25 HYDROXY; Future

## 2021-11-27 DIAGNOSIS — F41.9 ANXIETY: ICD-10-CM

## 2021-11-27 RX ORDER — BUSPIRONE HYDROCHLORIDE 10 MG/1
TABLET ORAL
Qty: 45 TABLET | Refills: 0 | Status: SHIPPED | OUTPATIENT
Start: 2021-11-27 | End: 2021-11-27 | Stop reason: SDUPTHER

## 2021-11-27 NOTE — PROGRESS NOTES
Patient indicates that she has not received her medication and has been out of buspirone for one week.  She is taking 3 times a day.  I notified patient that I would fill enough medication for 2 weeks and she would need to discuss further with her pcp.  Patient agreed.

## 2021-12-01 DIAGNOSIS — F41.9 ANXIETY: ICD-10-CM

## 2021-12-01 RX ORDER — BUPROPION HYDROCHLORIDE 150 MG/1
150 TABLET ORAL EVERY MORNING
Qty: 90 TABLET | Refills: 1 | Status: SHIPPED | OUTPATIENT
Start: 2021-12-01 | End: 2022-04-25 | Stop reason: SDUPTHER

## 2021-12-01 NOTE — TELEPHONE ENCOUNTER
Pt. LVM requesting    Was the patient seen in the last year in this department? 10/21/21    Does patient have an active prescription for medications requested? Yes    Received Request Via: Patient    Hospital Outpatient Visit on 10/08/2021   Component Date Value   • Sodium 10/08/2021 139    • Potassium 10/08/2021 4.1    • Chloride 10/08/2021 105    • Co2 10/08/2021 23    • Anion Gap 10/08/2021 11.0    • Glucose 10/08/2021 98    • Bun 10/08/2021 16    • Creatinine 10/08/2021 0.73    • Calcium 10/08/2021 9.8    • AST(SGOT) 10/08/2021 18    • ALT(SGPT) 10/08/2021 13    • Alkaline Phosphatase 10/08/2021 59    • Total Bilirubin 10/08/2021 0.5    • Albumin 10/08/2021 4.9    • Total Protein 10/08/2021 7.7    • Globulin 10/08/2021 2.8    • A-G Ratio 10/08/2021 1.8    • Cholesterol,Tot 10/08/2021 279*   • Triglycerides 10/08/2021 192*   • HDL 10/08/2021 49    • LDL 10/08/2021 192*   • WBC 10/08/2021 5.1    • RBC 10/08/2021 4.73    • Hemoglobin 10/08/2021 14.2    • Hematocrit 10/08/2021 41.6    • MCV 10/08/2021 87.9    • MCH 10/08/2021 30.0    • MCHC 10/08/2021 34.1    • RDW 10/08/2021 40.2    • Platelet Count 10/08/2021 225    • MPV 10/08/2021 12.5    • Neutrophils-Polys 10/08/2021 51.00    • Lymphocytes 10/08/2021 27.90    • Monocytes 10/08/2021 6.20    • Eosinophils 10/08/2021 13.50*   • Basophils 10/08/2021 1.20    • Immature Granulocytes 10/08/2021 0.20    • Nucleated RBC 10/08/2021 0.00    • Neutrophils (Absolute) 10/08/2021 2.62    • Lymphs (Absolute) 10/08/2021 1.43    • Monos (Absolute) 10/08/2021 0.32    • Eos (Absolute) 10/08/2021 0.69*   • Baso (Absolute) 10/08/2021 0.06    • Immature Granulocytes (a* 10/08/2021 0.01    • NRBC (Absolute) 10/08/2021 0.00    • 25-Hydroxy   Vitamin D 25 10/08/2021 28*   • GFR If  10/08/2021 >60    • GFR If Non  [employer]meri* 10/08/2021 >60

## 2021-12-27 DIAGNOSIS — F41.9 ANXIETY: ICD-10-CM

## 2021-12-27 DIAGNOSIS — K52.9 COLITIS: ICD-10-CM

## 2021-12-27 NOTE — TELEPHONE ENCOUNTER
Was the patient seen in the last year in this department? Yes    Does patient have an active prescription for medications requested? No     Received Request Via: Patient    Hospital Outpatient Visit on 10/08/2021   Component Date Value   • Sodium 10/08/2021 139    • Potassium 10/08/2021 4.1    • Chloride 10/08/2021 105    • Co2 10/08/2021 23    • Anion Gap 10/08/2021 11.0    • Glucose 10/08/2021 98    • Bun 10/08/2021 16    • Creatinine 10/08/2021 0.73    • Calcium 10/08/2021 9.8    • AST(SGOT) 10/08/2021 18    • ALT(SGPT) 10/08/2021 13    • Alkaline Phosphatase 10/08/2021 59    • Total Bilirubin 10/08/2021 0.5    • Albumin 10/08/2021 4.9    • Total Protein 10/08/2021 7.7    • Globulin 10/08/2021 2.8    • A-G Ratio 10/08/2021 1.8    • Cholesterol,Tot 10/08/2021 279*   • Triglycerides 10/08/2021 192*   • HDL 10/08/2021 49    • LDL 10/08/2021 192*   • WBC 10/08/2021 5.1    • RBC 10/08/2021 4.73    • Hemoglobin 10/08/2021 14.2    • Hematocrit 10/08/2021 41.6    • MCV 10/08/2021 87.9    • MCH 10/08/2021 30.0    • MCHC 10/08/2021 34.1    • RDW 10/08/2021 40.2    • Platelet Count 10/08/2021 225    • MPV 10/08/2021 12.5    • Neutrophils-Polys 10/08/2021 51.00    • Lymphocytes 10/08/2021 27.90    • Monocytes 10/08/2021 6.20    • Eosinophils 10/08/2021 13.50*   • Basophils 10/08/2021 1.20    • Immature Granulocytes 10/08/2021 0.20    • Nucleated RBC 10/08/2021 0.00    • Neutrophils (Absolute) 10/08/2021 2.62    • Lymphs (Absolute) 10/08/2021 1.43    • Monos (Absolute) 10/08/2021 0.32    • Eos (Absolute) 10/08/2021 0.69*   • Baso (Absolute) 10/08/2021 0.06    • Immature Granulocytes (a* 10/08/2021 0.01    • NRBC (Absolute) 10/08/2021 0.00    • 25-Hydroxy   Vitamin D 25 10/08/2021 28*   • GFR If  10/08/2021 >60    • GFR If Non  [employer]meri* 10/08/2021 >60    ]

## 2021-12-28 RX ORDER — MESALAMINE 500 MG/1
1000 CAPSULE, EXTENDED RELEASE ORAL 4 TIMES DAILY
Qty: 240 CAPSULE | Refills: 1 | Status: SHIPPED | OUTPATIENT
Start: 2021-12-28 | End: 2022-04-22 | Stop reason: SDUPTHER

## 2021-12-28 RX ORDER — BUSPIRONE HYDROCHLORIDE 10 MG/1
TABLET ORAL
Qty: 90 TABLET | Refills: 5 | Status: SHIPPED | OUTPATIENT
Start: 2021-12-28 | End: 2022-04-25 | Stop reason: SDUPTHER

## 2021-12-29 ENCOUNTER — TELEPHONE (OUTPATIENT)
Dept: MEDICAL GROUP | Facility: CLINIC | Age: 36
End: 2021-12-29

## 2021-12-29 NOTE — TELEPHONE ENCOUNTER
VOICEMAIL  1. Caller Name: pt                      Call Back Number: [phone] (home)     2. Message: Pt. LVM saying that we have not returned calls and she needs meds.     After further review med was approved yesterday.     Called and LVM for pt. That meds approved and sent to pharm. Pt. Can call me back if needed at [phone].

## 2022-01-07 ENCOUNTER — TELEPHONE (OUTPATIENT)
Dept: MEDICAL GROUP | Facility: CLINIC | Age: 37
End: 2022-01-07

## 2022-01-07 NOTE — TELEPHONE ENCOUNTER
Phone Number Called: [phone] (home)       Call outcome: Spoke to patient regarding message below.    Message: Pt called regaurding her Colitis medication. She said her pharmacy did not have her medication. I see her last prescription was filled on 12/28/21

## 2022-03-31 ENCOUNTER — HOSPITAL ENCOUNTER (OUTPATIENT)
Facility: MEDICAL CENTER | Age: 37
End: 2022-03-31
Attending: PHYSICIAN ASSISTANT
Payer: COMMERCIAL

## 2022-03-31 ENCOUNTER — NON-PROVIDER VISIT (OUTPATIENT)
Dept: MEDICAL GROUP | Facility: CLINIC | Age: 37
End: 2022-03-31
Payer: COMMERCIAL

## 2022-03-31 DIAGNOSIS — E78.2 MIXED HYPERLIPIDEMIA: ICD-10-CM

## 2022-03-31 DIAGNOSIS — E55.9 VITAMIN D DEFICIENCY: ICD-10-CM

## 2022-03-31 LAB — AMBIGUOUS DTTM AMBI4: NORMAL

## 2022-03-31 PROCEDURE — 99000 SPECIMEN HANDLING OFFICE-LAB: CPT | Performed by: PHYSICIAN ASSISTANT

## 2022-03-31 PROCEDURE — 80061 LIPID PANEL: CPT

## 2022-03-31 PROCEDURE — 36415 COLL VENOUS BLD VENIPUNCTURE: CPT | Performed by: PHYSICIAN ASSISTANT

## 2022-03-31 PROCEDURE — 82306 VITAMIN D 25 HYDROXY: CPT

## 2022-04-01 LAB
25(OH)D3 SERPL-MCNC: 56 NG/ML (ref 30–100)
CHOLEST SERPL-MCNC: 169 MG/DL (ref 100–199)
HDLC SERPL-MCNC: 51 MG/DL
LDLC SERPL CALC-MCNC: 96 MG/DL
TRIGL SERPL-MCNC: 112 MG/DL (ref 0–149)

## 2022-04-25 ENCOUNTER — OFFICE VISIT (OUTPATIENT)
Dept: MEDICAL GROUP | Facility: CLINIC | Age: 37
End: 2022-04-25
Payer: COMMERCIAL

## 2022-04-25 VITALS
TEMPERATURE: 98.5 F | HEART RATE: 77 BPM | DIASTOLIC BLOOD PRESSURE: 62 MMHG | RESPIRATION RATE: 16 BRPM | BODY MASS INDEX: 21.68 KG/M2 | WEIGHT: 117.8 LBS | SYSTOLIC BLOOD PRESSURE: 92 MMHG | OXYGEN SATURATION: 98 % | HEIGHT: 62 IN

## 2022-04-25 DIAGNOSIS — F31.9 BIPOLAR 1 DISORDER (HCC): ICD-10-CM

## 2022-04-25 DIAGNOSIS — Z13.29 THYROID DISORDER SCREEN: ICD-10-CM

## 2022-04-25 DIAGNOSIS — M43.17 SPONDYLOLISTHESIS AT L5-S1 LEVEL: ICD-10-CM

## 2022-04-25 DIAGNOSIS — K52.9 COLITIS: ICD-10-CM

## 2022-04-25 DIAGNOSIS — E78.2 MIXED HYPERLIPIDEMIA: ICD-10-CM

## 2022-04-25 DIAGNOSIS — E55.9 VITAMIN D DEFICIENCY: ICD-10-CM

## 2022-04-25 DIAGNOSIS — F41.9 ANXIETY: ICD-10-CM

## 2022-04-25 PROCEDURE — 99214 OFFICE O/P EST MOD 30 MIN: CPT | Performed by: PHYSICIAN ASSISTANT

## 2022-04-25 RX ORDER — BUSPIRONE HYDROCHLORIDE 10 MG/1
TABLET ORAL
Qty: 270 TABLET | Refills: 1 | Status: SHIPPED | OUTPATIENT
Start: 2022-04-25 | End: 2022-12-01

## 2022-04-25 RX ORDER — MESALAMINE 500 MG/1
1000 CAPSULE, EXTENDED RELEASE ORAL 4 TIMES DAILY
Qty: 240 CAPSULE | Refills: 1 | Status: SHIPPED | OUTPATIENT
Start: 2022-04-25 | End: 2022-08-30 | Stop reason: SDUPTHER

## 2022-04-25 RX ORDER — ROSUVASTATIN CALCIUM 10 MG/1
10 TABLET, COATED ORAL EVERY EVENING
Qty: 90 TABLET | Refills: 1 | Status: SHIPPED | OUTPATIENT
Start: 2022-04-25 | End: 2023-03-28

## 2022-04-25 RX ORDER — GABAPENTIN 400 MG/1
400 CAPSULE ORAL 3 TIMES DAILY
Qty: 270 CAPSULE | Refills: 1 | Status: SHIPPED | OUTPATIENT
Start: 2022-04-25 | End: 2022-08-30 | Stop reason: SDUPTHER

## 2022-04-25 RX ORDER — QUETIAPINE FUMARATE 100 MG/1
100 TABLET, FILM COATED ORAL 2 TIMES DAILY WITH MEALS
Qty: 180 TABLET | Refills: 1 | Status: SHIPPED | OUTPATIENT
Start: 2022-04-25 | End: 2022-08-16 | Stop reason: SDUPTHER

## 2022-04-25 RX ORDER — BUPROPION HYDROCHLORIDE 150 MG/1
150 TABLET ORAL EVERY MORNING
Qty: 90 TABLET | Refills: 1 | Status: SHIPPED | OUTPATIENT
Start: 2022-04-25 | End: 2022-12-08

## 2022-04-25 ASSESSMENT — FIBROSIS 4 INDEX: FIB4 SCORE: 0.8

## 2022-04-25 NOTE — PROGRESS NOTES
cc:  hyperlipidemia    Subjective:     Lisa Gomes is a 36 y.o. female presenting for hyperlipidemia      Patient presents to the office for hyperlipidemia.  Patient is transferring care from [facility].   Patient is here mainly to follow-up with her cholesterol.  She was started on medication approximately 6 months ago and is here to follow-up with her lab results since being on medication.  She states that she is tolerating the medication well and is not having any side effects.    Patient is also doing well taking a vitamin D supplement.  She is found that since starting the vitamin D supplement, her depression has been less.    Patient does have colitis, bipolar 1 disorder, anxiety, and spondylolisthesis.  She is taking medications for these issues and is needing refills on her Pentasa for her colitis, Seroquel for her bipolar, Wellbutrin and buspirone for her anxiety and gabapentin for her spondylolisthesis she denies any further symptoms or issues at this time.    Review of systems:  See above.   Denies any symptoms unless previously indicated.        Current Outpatient Medications:   •  rosuvastatin (CRESTOR) 10 MG Tab, Take 1 Tablet by mouth every evening., Disp: 90 Tablet, Rfl: 1  •  QUEtiapine (SEROQUEL) 100 MG Tab, Take 1 Tablet by mouth 2 times a day with meals. Take two tablets by mouth every night at bedtime, Disp: 180 Tablet, Rfl: 1  •  busPIRone (BUSPAR) 10 MG Tab tablet, TAKE ONE TABLET BY MOUTH THREE TIMES A DAY (BUSPAR), Disp: 270 Tablet, Rfl: 1  •  gabapentin (NEURONTIN) 400 MG Cap, Take 1 Capsule by mouth 3 times a day., Disp: 270 Capsule, Rfl: 1  •  buPROPion (WELLBUTRIN XL) 150 MG XL tablet, Take 1 Tablet by mouth every morning., Disp: 90 Tablet, Rfl: 1  •  mesalamine extended-release (PENTASA) 500 MG capsule, Take 2 Capsules by mouth 4 times a day., Disp: 240 Capsule, Rfl: 1    Allergies, past medical history, past surgical history, family history, social history reviewed and  "updated    Objective:     Vitals: BP (!) 92/62 (BP Location: Left arm, Patient Position: Sitting, BP Cuff Size: Adult)   Pulse 77   Temp 36.9 °C (98.5 °F) (Temporal)   Resp 16   Ht 1.562 m (5' 1.5\")   Wt 53.4 kg (117 lb 12.8 oz)   SpO2 98%   BMI 21.90 kg/m²   General: Alert, pleasant, NAD  EYES:   PERRL, EOMI, no icterus or pallor.  Conjunctivae and lids normal.   HENT:  Normocephalic.  External ears normal.  Neck supple. Respiratory: Normal respiratory effort.    Abdomen: Not obese  Skin: Warm, dry, no rashes.  Musculoskeletal: Gait is normal.  Moves all extremities well.    Extremities: normal range of motion all extremities.   Neurological: No tremors, sensation grossly intact,  CN2-12 intact.  Psych:  Affect/mood is normal, judgement is good, memory is intact, grooming is appropriate.    Assessment/Plan:     Lisa was seen today for lab results and dyslipidemia.    Diagnoses and all orders for this visit:    Mixed hyperlipidemia  -     rosuvastatin (CRESTOR) 10 MG Tab; Take 1 Tablet by mouth every evening.  -     Lipid Profile; Future  -     Comp Metabolic Panel; Future  Vitamin D deficiency  -     VITAMIN D,25 HYDROXY; Future  Thyroid disorder screen  -     TSH; Future  -     FREE THYROXINE; Future    Cholesterol discussed with patient.  Cholesterol medications have been effective.  We will continue with her current dose.  Vitamin D has also been effective as well.  We will follow-up in 6 months with labs and will also screen for thyroid at the same time.    Colitis  -     mesalamine extended-release (PENTASA) 500 MG capsule; Take 2 Capsules by mouth 4 times a day.  Bipolar 1 disorder (HCC)  -     QUEtiapine (SEROQUEL) 100 MG Tab; Take 1 Tablet by mouth 2 times a day with meals. Take two tablets by mouth every night at bedtime  Anxiety  -     busPIRone (BUSPAR) 10 MG Tab tablet; TAKE ONE TABLET BY MOUTH THREE TIMES A DAY (BUSPAR)  -     buPROPion (WELLBUTRIN XL) 150 MG XL tablet; Take 1 Tablet by mouth " every morning.  Spondylolisthesis at L5-S1 level  -     gabapentin (NEURONTIN) 400 MG Cap; Take 1 Capsule by mouth 3 times a day.      Medications have been refilled at this time.  We will continue to monitor and patient will follow-up in 6 months.      Return in about 6 months (around 10/25/2022).    Please note that this dictation was created using voice recognition software. I have made every reasonable attempt to correct obvious errors, but expect that there are errors of grammar and possible content that I did not discover before finalizing note.

## 2022-05-03 ENCOUNTER — TELEPHONE (OUTPATIENT)
Dept: MEDICAL GROUP | Facility: CLINIC | Age: 37
End: 2022-05-03
Payer: COMMERCIAL

## 2022-05-03 NOTE — TELEPHONE ENCOUNTER
Phone Number Called: [phone] (home)       Call outcome: Left detailed message for patient. Informed to call back with any additional questions.    Message: Left message for pt to clarify how she is taking this medication. Stated it was okay to call back or send [employer] message.

## 2022-05-03 NOTE — TELEPHONE ENCOUNTER
VOICEMAIL  1. Caller Name: [facility]                       Call Back Number: [phone]    2. Message: Pt's pharmacy called for clarification on prescription instructions. Medication is Quetiapine 100mg. On the instructions it states: Take 1 Tablet by mouth 2 times a day with meals. Take two tablets by mouth every night at bedtime.  Pharmacy needing to know which instructions are accurate.    3. Patient approves office to leave a detailed voicemail/MyChart message: yes

## 2022-08-16 ENCOUNTER — TELEPHONE (OUTPATIENT)
Dept: MEDICAL GROUP | Facility: MEDICAL CENTER | Age: 37
End: 2022-08-16
Payer: COMMERCIAL

## 2022-08-16 DIAGNOSIS — F31.9 BIPOLAR 1 DISORDER (HCC): ICD-10-CM

## 2022-08-16 NOTE — TELEPHONE ENCOUNTER
1. Caller Name: NIK                        Call Back Number: .      How would the patient prefer to be contacted with a response: Phone call OK to leave a detailed message    [facility] is asking to verify directions listed on prescriptions.   Rx shows to take 1 tab 2 times a day with meals, as well as take two tabs by mouth every night at bed time.    Pharmacy is needing a new script to show correct signature    Private car

## 2022-08-17 RX ORDER — QUETIAPINE FUMARATE 100 MG/1
200 TABLET, FILM COATED ORAL
Qty: 60 TABLET | Refills: 2 | Status: SHIPPED | OUTPATIENT
Start: 2022-08-17 | End: 2023-03-27

## 2022-08-30 DIAGNOSIS — K52.9 COLITIS: ICD-10-CM

## 2022-08-30 DIAGNOSIS — M43.17 SPONDYLOLISTHESIS AT L5-S1 LEVEL: ICD-10-CM

## 2022-08-31 RX ORDER — GABAPENTIN 400 MG/1
400 CAPSULE ORAL 3 TIMES DAILY
Qty: 270 CAPSULE | Refills: 0 | Status: SHIPPED | OUTPATIENT
Start: 2022-08-31 | End: 2023-05-24

## 2022-08-31 RX ORDER — MESALAMINE 500 MG/1
1000 CAPSULE, EXTENDED RELEASE ORAL 4 TIMES DAILY
Qty: 240 CAPSULE | Refills: 0 | Status: SHIPPED
Start: 2022-08-31 | End: 2022-10-18

## 2022-08-31 NOTE — TELEPHONE ENCOUNTER
Received request via: Patient    Was the patient seen in the last year in this department? Yes    Does the patient have an active prescription (recently filled or refills available) for medication(s) requested? No    Last OV: 4/25/22  Last labs: 3/31/22

## 2022-10-06 ENCOUNTER — HOSPITAL ENCOUNTER (OUTPATIENT)
Facility: MEDICAL CENTER | Age: 37
End: 2022-10-06
Attending: PHYSICIAN ASSISTANT
Payer: COMMERCIAL

## 2022-10-06 ENCOUNTER — NON-PROVIDER VISIT (OUTPATIENT)
Dept: MEDICAL GROUP | Facility: CLINIC | Age: 37
End: 2022-10-06
Payer: COMMERCIAL

## 2022-10-06 DIAGNOSIS — Z13.29 THYROID DISORDER SCREEN: ICD-10-CM

## 2022-10-06 DIAGNOSIS — E55.9 VITAMIN D DEFICIENCY: ICD-10-CM

## 2022-10-06 DIAGNOSIS — E78.2 MIXED HYPERLIPIDEMIA: ICD-10-CM

## 2022-10-06 LAB
25(OH)D3 SERPL-MCNC: 52 NG/ML (ref 30–100)
ALBUMIN SERPL BCP-MCNC: 4.5 G/DL (ref 3.2–4.9)
ALBUMIN/GLOB SERPL: 2 G/DL
ALP SERPL-CCNC: 71 U/L (ref 30–99)
ALT SERPL-CCNC: 47 U/L (ref 2–50)
ANION GAP SERPL CALC-SCNC: 10 MMOL/L (ref 7–16)
AST SERPL-CCNC: 35 U/L (ref 12–45)
BILIRUB SERPL-MCNC: 0.4 MG/DL (ref 0.1–1.5)
BUN SERPL-MCNC: 12 MG/DL (ref 8–22)
CALCIUM SERPL-MCNC: 9.4 MG/DL (ref 8.5–10.5)
CHLORIDE SERPL-SCNC: 104 MMOL/L (ref 96–112)
CHOLEST SERPL-MCNC: 143 MG/DL (ref 100–199)
CO2 SERPL-SCNC: 25 MMOL/L (ref 20–33)
CREAT SERPL-MCNC: 0.8 MG/DL (ref 0.5–1.4)
GFR SERPLBLD CREATININE-BSD FMLA CKD-EPI: 97 ML/MIN/1.73 M 2
GLOBULIN SER CALC-MCNC: 2.2 G/DL (ref 1.9–3.5)
GLUCOSE SERPL-MCNC: 98 MG/DL (ref 65–99)
HDLC SERPL-MCNC: 65 MG/DL
LDLC SERPL CALC-MCNC: 59 MG/DL
POTASSIUM SERPL-SCNC: 4 MMOL/L (ref 3.6–5.5)
PROT SERPL-MCNC: 6.7 G/DL (ref 6–8.2)
SODIUM SERPL-SCNC: 139 MMOL/L (ref 135–145)
T4 FREE SERPL-MCNC: 0.76 NG/DL (ref 0.93–1.7)
TRIGL SERPL-MCNC: 97 MG/DL (ref 0–149)
TSH SERPL DL<=0.005 MIU/L-ACNC: 0.89 UIU/ML (ref 0.38–5.33)

## 2022-10-06 PROCEDURE — 82306 VITAMIN D 25 HYDROXY: CPT

## 2022-10-06 PROCEDURE — 84443 ASSAY THYROID STIM HORMONE: CPT

## 2022-10-06 PROCEDURE — 80061 LIPID PANEL: CPT

## 2022-10-06 PROCEDURE — 84439 ASSAY OF FREE THYROXINE: CPT

## 2022-10-06 PROCEDURE — 36415 COLL VENOUS BLD VENIPUNCTURE: CPT | Performed by: PHYSICIAN ASSISTANT

## 2022-10-06 PROCEDURE — 80053 COMPREHEN METABOLIC PANEL: CPT

## 2022-10-17 ENCOUNTER — PATIENT MESSAGE (OUTPATIENT)
Dept: MEDICAL GROUP | Facility: CLINIC | Age: 37
End: 2022-10-17
Payer: COMMERCIAL

## 2022-10-17 DIAGNOSIS — K52.9 COLITIS: ICD-10-CM

## 2022-10-18 RX ORDER — MESALAMINE 500 MG/1
1000 CAPSULE, EXTENDED RELEASE ORAL 4 TIMES DAILY
Qty: 720 CAPSULE | Refills: 1 | Status: SHIPPED
Start: 2022-10-18 | End: 2023-03-27

## 2022-10-27 ENCOUNTER — OFFICE VISIT (OUTPATIENT)
Dept: MEDICAL GROUP | Facility: CLINIC | Age: 37
End: 2022-10-27
Payer: COMMERCIAL

## 2022-10-27 VITALS
BODY MASS INDEX: 22.45 KG/M2 | WEIGHT: 122 LBS | DIASTOLIC BLOOD PRESSURE: 60 MMHG | SYSTOLIC BLOOD PRESSURE: 118 MMHG | HEART RATE: 67 BPM | TEMPERATURE: 98.2 F | HEIGHT: 62 IN | OXYGEN SATURATION: 96 % | RESPIRATION RATE: 20 BRPM

## 2022-10-27 DIAGNOSIS — R53.83 OTHER FATIGUE: ICD-10-CM

## 2022-10-27 DIAGNOSIS — R94.6 ABNORMAL THYROID EXAM: ICD-10-CM

## 2022-10-27 DIAGNOSIS — Z97.5 IUD (INTRAUTERINE DEVICE) IN PLACE: ICD-10-CM

## 2022-10-27 PROCEDURE — 99214 OFFICE O/P EST MOD 30 MIN: CPT | Performed by: PHYSICIAN ASSISTANT

## 2022-10-27 ASSESSMENT — FIBROSIS 4 INDEX: FIB4 SCORE: 0.82

## 2022-10-27 NOTE — PROGRESS NOTES
"cc: Hyperlipidemia    Subjective:     Lisa Gomes is a 36 y.o. female presenting for hyperlipidemia    Patient presents to the office for hyperlipidemia.  She also had abnormal thyroid testing. Patient states that she has been feeling more fatigued.  Her ft4 was 0.76    Referral to gynecology is needed in order to have her IUD changed.  She states that it is due for a change in November.  She had a paragaurd placed 10 years ago.  There is a family history of thyroid problems.  She has both hyper and hypothyroid in her family.      Review of systems:  See above.   Denies any symptoms unless previously indicated.        Current Outpatient Medications:     mesalamine extended-release (PENTASA) 500 MG capsule, Take 2 Capsules by mouth 4 times a day., Disp: 720 Capsule, Rfl: 1    gabapentin (NEURONTIN) 400 MG Cap, Take 1 Capsule by mouth 3 times a day., Disp: 270 Capsule, Rfl: 0    QUEtiapine (SEROQUEL) 100 MG Tab, Take 2 Tablets by mouth every night at bedtime., Disp: 60 Tablet, Rfl: 2    rosuvastatin (CRESTOR) 10 MG Tab, Take 1 Tablet by mouth every evening., Disp: 90 Tablet, Rfl: 1    busPIRone (BUSPAR) 10 MG Tab tablet, TAKE ONE TABLET BY MOUTH THREE TIMES A DAY (BUSPAR), Disp: 270 Tablet, Rfl: 1    buPROPion (WELLBUTRIN XL) 150 MG XL tablet, Take 1 Tablet by mouth every morning., Disp: 90 Tablet, Rfl: 1    Allergies, past medical history, past surgical history, family history, social history reviewed and updated    Objective:     Vitals: /60 (BP Location: Left arm, Patient Position: Sitting, BP Cuff Size: Adult)   Pulse 67   Temp 36.8 °C (98.2 °F) (Temporal)   Resp 20   Ht 1.562 m (5' 1.5\")   Wt 55.3 kg (122 lb)   SpO2 96%   BMI 22.68 kg/m²   General: Alert, pleasant, NAD  EYES:   PERRL, EOMI, no icterus or pallor.  Conjunctivae and lids normal.   HENT:  Normocephalic.  External ears normal.  Neck supple.    Respiratory: Normal respiratory effort.    Abdomen: Not obese  Skin: Warm, dry, no " rashes.  Musculoskeletal: Gait is normal.  Moves all extremities well.    Extremities: normal range of motion all extremities.   Neurological: No tremors, sensation grossly intact,  gait is normal, CN2-12 intact.  Psych:  Affect/mood is normal, judgement is good, memory is intact, grooming is appropriate.    Assessment/Plan:     Lisa was seen today for lab results and contraception.    Diagnoses and all orders for this visit:    Abnormal thyroid exam  -     TSH; Future  -     FREE THYROXINE; Future  -     ANTITHYROGLOBULIN AB; Future  -     THYROID PEROXIDASE  (TPO) AB; Future  -     ANTI-DNA (DS); Future  -     C3+C4+COMPT  -     COMPLEMENT TOTAL (CH50); Future  -     MPO/IN-3 (ANCA) ABS; Future  -     SMITH AB IGG; Future  -     URINALYSIS,CULTURE IF INDICATED; Future  -     RHEUMATOID ARTHRITIS FACTOR; Future  -     CRP HIGH SENSITIVE (CARDIAC); Future  -     Sed Rate; Future  -     JEROME REFLEXIVE PROFILE; Future  -     CCP; Future    Other fatigue  -     TSH; Future  -     FREE THYROXINE; Future  -     ANTITHYROGLOBULIN AB; Future  -     THYROID PEROXIDASE  (TPO) AB; Future  -     ANTI-DNA (DS); Future  -     C3+C4+COMPT  -     COMPLEMENT TOTAL (CH50); Future  -     MPO/IN-3 (ANCA) ABS; Future  -     SMITH AB IGG; Future  -     URINALYSIS,CULTURE IF INDICATED; Future  -     RHEUMATOID ARTHRITIS FACTOR; Future  -     CRP HIGH SENSITIVE (CARDIAC); Future  -     Sed Rate; Future  -     JEROME REFLEXIVE PROFILE; Future  -     CCP; Future    IUD (intrauterine device) in place  -     Referral to Gynecology      Fatigue is a new problem with unknown etiology.  As patient is having increased fatigue, and as there are abnormal thyroid problems in patient's family, will order further autoimmune and thyroid testing to evaluate further.  Plan will be to follow-up in 3 months, sooner if needed.  We will also submit a referral to gynecology so that patient can have IUD replaced.    Return in about 3 months (around  1/27/2023).    Please note that this dictation was created using voice recognition software. I have made every reasonable attempt to correct obvious errors, but expect that there are errors of grammar and possible content that I did not discover before finalizing note.

## 2022-12-01 DIAGNOSIS — F41.9 ANXIETY: ICD-10-CM

## 2022-12-01 RX ORDER — BUSPIRONE HYDROCHLORIDE 10 MG/1
TABLET ORAL
Qty: 90 TABLET | Refills: 0 | Status: SHIPPED | OUTPATIENT
Start: 2022-12-01 | End: 2023-01-16 | Stop reason: SDUPTHER

## 2022-12-01 NOTE — TELEPHONE ENCOUNTER
Was the patient seen in the last year in this department? Yes    Does patient have an active prescription for medications requested? Yes    Received Request Via: Pharmacy    Hospital Outpatient Visit on 10/06/2022   Component Date Value    Cholesterol,Tot 10/06/2022 143     Triglycerides 10/06/2022 97     HDL 10/06/2022 65     LDL 10/06/2022 59     Sodium 10/06/2022 139     Potassium 10/06/2022 4.0     Chloride 10/06/2022 104     Co2 10/06/2022 25     Anion Gap 10/06/2022 10.0     Glucose 10/06/2022 98     Bun 10/06/2022 12     Creatinine 10/06/2022 0.80     Calcium 10/06/2022 9.4     AST(SGOT) 10/06/2022 35     ALT(SGPT) 10/06/2022 47     Alkaline Phosphatase 10/06/2022 71     Total Bilirubin 10/06/2022 0.4     Albumin 10/06/2022 4.5     Total Protein 10/06/2022 6.7     Globulin 10/06/2022 2.2     A-G Ratio 10/06/2022 2.0     TSH 10/06/2022 0.890     Free T-4 10/06/2022 0.76 (L)     25-Hydroxy   Vitamin D 25 10/06/2022 52     GFR (CKD-EPI) 10/06/2022 97    Hospital Outpatient Visit on 03/31/2022   Component Date Value    Cholesterol,Tot 03/31/2022 169     Triglycerides 03/31/2022 112     HDL 03/31/2022 51     LDL 03/31/2022 96     25-Hydroxy   Vitamin D 25 03/31/2022 56     Ambiguous Date/Time 03/31/2022 See Below:    ]

## 2022-12-07 DIAGNOSIS — F41.9 ANXIETY: ICD-10-CM

## 2022-12-08 RX ORDER — BUPROPION HYDROCHLORIDE 150 MG/1
TABLET ORAL
Qty: 90 TABLET | Refills: 0 | Status: SHIPPED | OUTPATIENT
Start: 2022-12-08 | End: 2023-03-09

## 2022-12-08 NOTE — TELEPHONE ENCOUNTER
Received request via: Patient    Was the patient seen in the last year in this department? Yes    Does the patient have an active prescription (recently filled or refills available) for medication(s) requested? No    Does the patient have custodial Plus and need 100 day supply (blood pressure, diabetes and cholesterol meds only)? Patient does not have SCP    Last OV 10/27/2022  Last Labs 10/6/2022

## 2023-01-16 DIAGNOSIS — F41.9 ANXIETY: ICD-10-CM

## 2023-01-16 RX ORDER — BUSPIRONE HYDROCHLORIDE 10 MG/1
TABLET ORAL
Qty: 90 TABLET | Refills: 0 | Status: SHIPPED | OUTPATIENT
Start: 2023-01-16 | End: 2023-02-20

## 2023-01-16 NOTE — TELEPHONE ENCOUNTER
Received request via: Pharmacy    Was the patient seen in the last year in this department? Yes    Does the patient have an active prescription (recently filled or refills available) for medication(s) requested? No    Does the patient have halfway Plus and need 100 day supply (blood pressure, diabetes and cholesterol meds only)? Medication is not for cholesterol, blood pressure or diabetes    Last OV: 10/27/22  Last labs: 10/6/22

## 2023-02-17 DIAGNOSIS — F41.9 ANXIETY: ICD-10-CM

## 2023-02-20 RX ORDER — BUSPIRONE HYDROCHLORIDE 10 MG/1
TABLET ORAL
Qty: 90 TABLET | Refills: 0 | Status: SHIPPED | OUTPATIENT
Start: 2023-02-20 | End: 2023-03-21

## 2023-03-15 ENCOUNTER — HOSPITAL ENCOUNTER (OUTPATIENT)
Facility: MEDICAL CENTER | Age: 38
End: 2023-03-15
Attending: PHYSICIAN ASSISTANT
Payer: COMMERCIAL

## 2023-03-15 ENCOUNTER — NON-PROVIDER VISIT (OUTPATIENT)
Dept: MEDICAL GROUP | Facility: CLINIC | Age: 38
End: 2023-03-15
Payer: COMMERCIAL

## 2023-03-15 DIAGNOSIS — R94.6 ABNORMAL THYROID EXAM: ICD-10-CM

## 2023-03-15 DIAGNOSIS — R53.83 OTHER FATIGUE: ICD-10-CM

## 2023-03-15 PROCEDURE — 36415 COLL VENOUS BLD VENIPUNCTURE: CPT | Performed by: PHYSICIAN ASSISTANT

## 2023-03-15 PROCEDURE — 86431 RHEUMATOID FACTOR QUANT: CPT

## 2023-03-15 PROCEDURE — 87086 URINE CULTURE/COLONY COUNT: CPT

## 2023-03-15 PROCEDURE — 86141 C-REACTIVE PROTEIN HS: CPT

## 2023-03-15 PROCEDURE — 84439 ASSAY OF FREE THYROXINE: CPT

## 2023-03-15 PROCEDURE — 85652 RBC SED RATE AUTOMATED: CPT

## 2023-03-15 PROCEDURE — 86800 THYROGLOBULIN ANTIBODY: CPT

## 2023-03-15 PROCEDURE — 86039 ANTINUCLEAR ANTIBODIES (ANA): CPT

## 2023-03-15 PROCEDURE — 86038 ANTINUCLEAR ANTIBODIES: CPT

## 2023-03-15 PROCEDURE — 84443 ASSAY THYROID STIM HORMONE: CPT

## 2023-03-15 PROCEDURE — 86200 CCP ANTIBODY: CPT

## 2023-03-15 PROCEDURE — 86376 MICROSOMAL ANTIBODY EACH: CPT

## 2023-03-15 PROCEDURE — 83516 IMMUNOASSAY NONANTIBODY: CPT

## 2023-03-15 PROCEDURE — 86225 DNA ANTIBODY NATIVE: CPT

## 2023-03-15 PROCEDURE — 81001 URINALYSIS AUTO W/SCOPE: CPT

## 2023-03-15 PROCEDURE — 86235 NUCLEAR ANTIGEN ANTIBODY: CPT | Mod: 91

## 2023-03-16 LAB
APPEARANCE UR: ABNORMAL
BACTERIA #/AREA URNS HPF: ABNORMAL /HPF
BILIRUB UR QL STRIP.AUTO: NEGATIVE
COLOR UR: YELLOW
CRP SERPL HS-MCNC: 0.2 MG/L (ref 0–3)
EPI CELLS #/AREA URNS HPF: ABNORMAL /HPF
ERYTHROCYTE [SEDIMENTATION RATE] IN BLOOD BY WESTERGREN METHOD: 4 MM/HOUR (ref 0–25)
GLUCOSE UR STRIP.AUTO-MCNC: NEGATIVE MG/DL
HYALINE CASTS #/AREA URNS LPF: ABNORMAL /LPF
KETONES UR STRIP.AUTO-MCNC: NEGATIVE MG/DL
LEUKOCYTE ESTERASE UR QL STRIP.AUTO: ABNORMAL
MICRO URNS: ABNORMAL
NITRITE UR QL STRIP.AUTO: NEGATIVE
PH UR STRIP.AUTO: 8 [PH] (ref 5–8)
PROT UR QL STRIP: NEGATIVE MG/DL
RBC # URNS HPF: ABNORMAL /HPF
RBC UR QL AUTO: NEGATIVE
RHEUMATOID FACT SER IA-ACNC: <10 IU/ML (ref 0–14)
SP GR UR STRIP.AUTO: 1.01
T4 FREE SERPL-MCNC: 0.91 NG/DL (ref 0.93–1.7)
THYROPEROXIDASE AB SERPL-ACNC: 15 IU/ML (ref 0–9)
TSH SERPL DL<=0.005 MIU/L-ACNC: 1.64 UIU/ML (ref 0.38–5.33)
UROBILINOGEN UR STRIP.AUTO-MCNC: 0.2 MG/DL
WBC #/AREA URNS HPF: ABNORMAL /HPF

## 2023-03-17 ENCOUNTER — TELEPHONE (OUTPATIENT)
Dept: MEDICAL GROUP | Facility: CLINIC | Age: 38
End: 2023-03-17
Payer: COMMERCIAL

## 2023-03-17 LAB
CCP IGG SERPL-ACNC: 3 UNITS (ref 0–19)
DSDNA AB TITR SER CLIF: NORMAL {TITER}
ENA SM IGG SER-ACNC: 0 AU/ML (ref 0–40)
MYELOPEROXIDASE AB SER-ACNC: 1 AU/ML (ref 0–19)
NUCLEAR IGG SER QL IA: DETECTED
PROTEINASE3 AB SER-ACNC: 0 AU/ML (ref 0–19)
THYROGLOB AB SERPL-ACNC: <0.9 IU/ML (ref 0–4)

## 2023-03-17 NOTE — TELEPHONE ENCOUNTER
Received message from Sanju at the lab stating that we need to get a recollect on pt CH50.     Patient has been informed.

## 2023-03-18 LAB
ANA INTERPRETIVE COMMENT Q5143: ABNORMAL
ANA PATTERN Q5144: ABNORMAL
ANA TITER Q5145: ABNORMAL
ANTINUCLEAR ANTIBODY (ANA), HEP-2, IGG Q5142: DETECTED
BACTERIA UR CULT: NORMAL
SIGNIFICANT IND 70042: NORMAL
SITE SITE: NORMAL
SOURCE SOURCE: NORMAL

## 2023-03-19 LAB
ENA JO1 AB TITR SER: 8 AU/ML (ref 0–40)
ENA SCL70 IGG SER QL: 1 AU/ML (ref 0–40)
ENA SS-B IGG SER IA-ACNC: 8 AU/ML (ref 0–40)
SSA52 R0ENA AB IGG Q0420: 2 AU/ML (ref 0–40)
SSA60 R0ENA AB IGG Q0419: 22 AU/ML (ref 0–40)
U1 SNRNP IGG SER QL: 2 UNITS (ref 0–19)

## 2023-03-26 LAB
CH50 SERPL-ACNC: >60 U/ML
THYROGLOB AB SERPL-ACNC: <1 IU/ML (ref 0–0.9)

## 2023-03-27 ENCOUNTER — OFFICE VISIT (OUTPATIENT)
Dept: MEDICAL GROUP | Facility: CLINIC | Age: 38
End: 2023-03-27
Payer: COMMERCIAL

## 2023-03-27 VITALS
HEART RATE: 73 BPM | TEMPERATURE: 97.6 F | SYSTOLIC BLOOD PRESSURE: 110 MMHG | WEIGHT: 122.2 LBS | OXYGEN SATURATION: 96 % | RESPIRATION RATE: 20 BRPM | DIASTOLIC BLOOD PRESSURE: 58 MMHG | BODY MASS INDEX: 23.07 KG/M2 | HEIGHT: 61 IN

## 2023-03-27 DIAGNOSIS — G47.09 OTHER INSOMNIA: ICD-10-CM

## 2023-03-27 DIAGNOSIS — F31.9 BIPOLAR 1 DISORDER (HCC): ICD-10-CM

## 2023-03-27 DIAGNOSIS — R76.8 POSITIVE ANA (ANTINUCLEAR ANTIBODY): ICD-10-CM

## 2023-03-27 DIAGNOSIS — R76.8 THYROID ANTIBODY POSITIVE: ICD-10-CM

## 2023-03-27 DIAGNOSIS — F41.9 ANXIETY: ICD-10-CM

## 2023-03-27 PROCEDURE — 99214 OFFICE O/P EST MOD 30 MIN: CPT | Performed by: PHYSICIAN ASSISTANT

## 2023-03-27 RX ORDER — QUETIAPINE FUMARATE 50 MG/1
TABLET, FILM COATED ORAL
Qty: 60 TABLET | Refills: 1 | Status: SHIPPED | OUTPATIENT
Start: 2023-03-27 | End: 2023-05-24

## 2023-03-27 ASSESSMENT — FIBROSIS 4 INDEX: FIB4 SCORE: 0.84

## 2023-03-27 NOTE — PROGRESS NOTES
"cc:  labs    Subjective:     Lisa Gomes is a 37 y.o. female presenting for labs      Patient presents to the office for labs.  Patient had lab work drawn before coming in to be seen.  She had been feeling fatigue and did have abnormal thyroid testing in the past.  She is here to follow-up with her most recent test results.    Patient states that she is needing a refill of seroquel.  She states that she would like to try and come off the seroquel.  She has been on it for a couple of years.  She is taking 100 mg every other day.  She has been taking Seroquel for anxiety, bipolar and insomnia.  She feels that she is doing well hence the reason for trying to come off.  However she has been trying to do it slowly and on her own.  She is requesting assistance with decreasing dose.    Review of systems:  See above.   Denies any symptoms unless previously indicated.        Current Outpatient Medications:     QUEtiapine (SEROQUEL) 50 MG tablet, Take 1-2 nightly, Disp: 60 Tablet, Rfl: 1    busPIRone (BUSPAR) 10 MG Tab tablet, TAKE ONE TABLET BY MOUTH THREE TIMES A DAY, Disp: 90 Tablet, Rfl: 0    buPROPion (WELLBUTRIN XL) 150 MG XL tablet, TAKE ONE TABLET BY MOUTH EVERY MORNING, Disp: 90 Tablet, Rfl: 0    gabapentin (NEURONTIN) 400 MG Cap, Take 1 Capsule by mouth 3 times a day., Disp: 270 Capsule, Rfl: 0    rosuvastatin (CRESTOR) 10 MG Tab, Take 1 Tablet by mouth every evening., Disp: 90 Tablet, Rfl: 1    Allergies, past medical history, past surgical history, family history, social history reviewed and updated    Objective:     Vitals: /58 (BP Location: Left arm, Patient Position: Sitting, BP Cuff Size: Adult)   Pulse 73   Temp 36.4 °C (97.6 °F) (Temporal)   Resp 20   Ht 1.56 m (5' 1.4\")   Wt 55.4 kg (122 lb 3.2 oz)   LMP 03/27/2023 (Exact Date)   SpO2 96%   BMI 22.79 kg/m²   General: Alert, pleasant, NAD  EYES:   PERRL, EOMI, no icterus or pallor.  Conjunctivae and lids normal.   HENT:  Normocephalic.  " External ears normal.  Neck supple.  Respiratory: Normal respiratory effort.    Abdomen: Not obese  Skin: Warm, dry, no rashes.  Musculoskeletal: Gait is normal.  Moves all extremities well.    Extremities: Normal range of motion all extremities.   Neurological: No tremors, sensation grossly intact, CN2-12 intact.  Psych:  Affect/mood is normal, judgement is good, memory is intact, grooming is appropriate.   Latest Reference Range & Units 03/15/23 07:50 03/23/23 05:33   Sed Rate Westergren 0 - 25 mm/hour 4    C Reactive Protein High Sensitive 0.0 - 3.0 mg/L 0.2    Urobilinogen, Urine Negative  0.2    Color  Yellow    Character  Cloudy !    Specific Gravity <1.035  1.012    Ph 5.0 - 8.0  8.0    Glucose Negative mg/dL Negative    Ketones Negative mg/dL Negative    Bilirubin Negative  Negative    Occult Blood Negative  Negative    Protein Negative mg/dL Negative    Nitrite Negative  Negative    Leukocyte Esterase Negative  Trace !    Micro Urine Req  Microscopic    WBC /hpf 10-20 !    RBC /hpf 0-2    Epithelial Cells /hpf Many !    Bacteria None /hpf Many !    Hyaline Cast /lpf 0-2    Complement Total Hemolytic >41 U/mL  >60   TSH 0.380 - 5.330 uIU/mL 1.640    Free T-4 0.93 - 1.70 ng/dL 0.91 (L)    Thyroglob Ab 0.0 - 0.9 IU/mL  <1.0   Rheumatoid Factor -Neph- 0 - 14 IU/mL <10    Anti-Dna -Ds  SEE BELOW    Microsomal -Tpo- Abs 0.0 - 9.0 IU/mL 15.0 (H)    Anti-Thyroglobulin 0.0 - 4.0 IU/mL <0.9    Anti-Scl-70 0 - 40 AU/mL 1    Zenobia-1 Antibody, IgG 0 - 40 AU/mL 8    Ccp Antibodies 0 - 19 Units 3    JEROME Interpretive Comment  See Note    JEROME Pattern  Speckled !    Antinuclear Antibody None Detected  Detected !    Leonard Antibodies 0 - 40 AU/mL 0    SSA 52 (R0)(MEENA) Ab, IgG 0 - 40 AU/mL 2    SSA 60 (R0)(MEENA) Ab, IgG 0 - 40 AU/mL 22    Sjogren'S Anti-Ss-B 0 - 40 AU/mL 8    Myeloperoxidase Ab 0 - 19 AU/mL 1    Serine Proteinase 3, IgG 0 - 19 AU/mL 0    JEROME Titer  1:320 !    Significant Indicator  NEG    Site  -    Source  UR     Antinuclear Antibody (JEROME), HEp-2, IgG <1:80  Detected (H)    Leonard/RNP IgG (MEENA) 0 - 19 Units 2    !: Data is abnormal  (L): Data is abnormally low  (H): Data is abnormally high      Assessment/Plan:     Lisa was seen today for lab results and medication refill.    Diagnoses and all orders for this visit:    Thyroid antibody positive  -     TSH WITH REFLEX TO FT4; Future  -     TRIIDOTHYRONINE; Future  -     ANTITHYROGLOBULIN AB; Future  -     THYROID PEROXIDASE  (TPO) AB; Future  Positive JEROME (antinuclear antibody)  -     RHEUMATOID ARTHRITIS FACTOR; Future  -     Sed Rate; Future  -     CRP HIGH SENSITIVE (CARDIAC); Future  -     JEROME REFLEXIVE PROFILE; Future  -     TSH WITH REFLEX TO FT4; Future  -     CCP; Future  -     ANTI-DNA (DS); Future  -     C3+C4+COMPT  -     COMPLEMENT TOTAL (CH50); Future  -     MPO/NY-3 (ANCA) ABS; Future  -     SMITH AB IGG; Future  -     THYROID PEROXIDASE  (TPO) AB; Future  -     Referral to Rheumatology    Although patient does have some positive thyroid antibody testing, she also has a positive JEROME with a 1-320 speckled pattern.  We will repeat labs in approximately 3 months but we will submit a referral to rheumatology as well.    Anxiety  -     QUEtiapine (SEROQUEL) 50 MG tablet; Take 1-2 nightly  Other insomnia  -     QUEtiapine (SEROQUEL) 50 MG tablet; Take 1-2 nightly  Bipolar 1 disorder (HCC)  -     QUEtiapine (SEROQUEL) 50 MG tablet; Take 1-2 nightly      We will decrease dose to 50 mg.  She can take 2 at night and when she is ready to decrease the dose, she can drop to 1 at night.  She can do this slowly at her comfort level.  If symptoms worsen, she will return to her previous dose.  Follow-up 3 months.      Return in about 3 months (around 6/27/2023), or if symptoms worsen or fail to improve.    Please note that this dictation was created using voice recognition software. I have made every reasonable attempt to correct obvious errors, but expect that there are errors  of grammar and possible content that I did not discover before finalizing note.

## 2023-04-11 DIAGNOSIS — R76.8 THYROID ANTIBODY POSITIVE: ICD-10-CM

## 2023-04-11 DIAGNOSIS — R94.6 ABNORMAL THYROID EXAM: ICD-10-CM

## 2023-04-25 DIAGNOSIS — Z97.5 IUD (INTRAUTERINE DEVICE) IN PLACE: ICD-10-CM

## 2023-05-24 DIAGNOSIS — F31.9 BIPOLAR 1 DISORDER (HCC): ICD-10-CM

## 2023-05-24 DIAGNOSIS — G47.09 OTHER INSOMNIA: ICD-10-CM

## 2023-05-24 DIAGNOSIS — F41.9 ANXIETY: ICD-10-CM

## 2023-05-24 DIAGNOSIS — M43.17 SPONDYLOLISTHESIS AT L5-S1 LEVEL: ICD-10-CM

## 2023-05-24 RX ORDER — GABAPENTIN 400 MG/1
CAPSULE ORAL
Qty: 270 CAPSULE | Refills: 0 | Status: SHIPPED | OUTPATIENT
Start: 2023-05-24 | End: 2023-08-24

## 2023-05-24 RX ORDER — QUETIAPINE FUMARATE 50 MG/1
TABLET, FILM COATED ORAL
Qty: 60 TABLET | Refills: 0 | Status: SHIPPED | OUTPATIENT
Start: 2023-05-24 | End: 2023-06-26 | Stop reason: SDUPTHER

## 2023-05-24 NOTE — TELEPHONE ENCOUNTER
Was the patient seen in the last year in this department? Yes    Does patient have an active prescription for medications requested? Yes    Received Request Via: Pharmacy    Orders Only on 03/23/2023   Component Date Value    Thyroglob Ab 03/23/2023 <1.0     Complement Total Hemolyt* 03/23/2023 >60    Hospital Outpatient Visit on 03/15/2023   Component Date Value    TSH 03/15/2023 1.640     Free T-4 03/15/2023 0.91 (L)     Anti-Thyroglobulin 03/15/2023 <0.9     Microsomal -Tpo- Abs 03/15/2023 15.0 (H)     Myeloperoxidase Ab 03/15/2023 1     Serine Proteinase 3, IgG 03/15/2023 0     Leonard Antibodies 03/15/2023 0     Color 03/15/2023 Yellow     Character 03/15/2023 Cloudy (A)     Specific Gravity 03/15/2023 1.012     Ph 03/15/2023 8.0     Glucose 03/15/2023 Negative     Ketones 03/15/2023 Negative     Protein 03/15/2023 Negative     Bilirubin 03/15/2023 Negative     Urobilinogen, Urine 03/15/2023 0.2     Nitrite 03/15/2023 Negative     Leukocyte Esterase 03/15/2023 Trace (A)     Occult Blood 03/15/2023 Negative     Micro Urine Req 03/15/2023 Microscopic     Rheumatoid Factor -Neph- 03/15/2023 <10     C Reactive Protein High * 03/15/2023 0.2     Sed Rate Westergren 03/15/2023 4     Antinuclear Antibody 03/15/2023 Detected (A)     Ccp Antibodies 03/15/2023 3     Anti-Dna -Ds 03/15/2023 SEE BELOW     WBC 03/15/2023 10-20 (A)     RBC 03/15/2023 0-2     Bacteria 03/15/2023 Many (A)     Epithelial Cells 03/15/2023 Many (A)     Hyaline Cast 03/15/2023 0-2     Significant Indicator 03/15/2023 NEG     Source 03/15/2023 UR     Site 03/15/2023 -     Culture Result 03/15/2023 Usual urogenital beverly ,000 cfu/mL     Antinuclear Antibody (AN* 03/15/2023 Detected (H)     JEROME Interpretive Comment 03/15/2023 See Note     JEROME Pattern 03/15/2023 Speckled (A)     JEROME Titer 03/15/2023 1:320 (A)     Leonard/RNP IgG (MEENA) 03/15/2023 2     Zenobia-1 Antibody, IgG 03/15/2023 8     Anti-Scl-70 03/15/2023 1     Sjogren'S Anti-Ss-B 03/15/2023 8      SSA 52 (R0)(MEENA) Ab, IgG 03/15/2023 2     SSA 60 (R0)(MEENA) Ab, IgG 03/15/2023 22    Hospital Outpatient Visit on 10/06/2022   Component Date Value    Cholesterol,Tot 10/06/2022 143     Triglycerides 10/06/2022 97     HDL 10/06/2022 65     LDL 10/06/2022 59     Sodium 10/06/2022 139     Potassium 10/06/2022 4.0     Chloride 10/06/2022 104     Co2 10/06/2022 25     Anion Gap 10/06/2022 10.0     Glucose 10/06/2022 98     Bun 10/06/2022 12     Creatinine 10/06/2022 0.80     Calcium 10/06/2022 9.4     AST(SGOT) 10/06/2022 35     ALT(SGPT) 10/06/2022 47     Alkaline Phosphatase 10/06/2022 71     Total Bilirubin 10/06/2022 0.4     Albumin 10/06/2022 4.5     Total Protein 10/06/2022 6.7     Globulin 10/06/2022 2.2     A-G Ratio 10/06/2022 2.0     TSH 10/06/2022 0.890     Free T-4 10/06/2022 0.76 (L)     25-Hydroxy   Vitamin D 25 10/06/2022 52     GFR (CKD-EPI) 10/06/2022 97    ]

## 2023-05-25 NOTE — TELEPHONE ENCOUNTER
Received request via: Pharmacy    Was the patient seen in the last year in this department? Yes    Does the patient have an active prescription (recently filled or refills available) for medication(s) requested? No    Does the patient have [facility]f Plus and need 100 day supply (blood pressure, diabetes and cholesterol meds only)? Patient does not have SCP    Last OV: 3/27/23  Last labs: 3/23/23

## 2023-06-13 DIAGNOSIS — F41.9 ANXIETY: ICD-10-CM

## 2023-06-13 RX ORDER — BUPROPION HYDROCHLORIDE 150 MG/1
TABLET ORAL
Qty: 90 TABLET | Refills: 0 | Status: SHIPPED | OUTPATIENT
Start: 2023-06-13 | End: 2023-09-12

## 2023-06-13 NOTE — TELEPHONE ENCOUNTER
Received request via: Patient    Was the patient seen in the last year in this department? Yes    Does the patient have an active prescription (recently filled or refills available) for medication(s) requested? No    Does the patient have senior living Plus and need 100 day supply (blood pressure, diabetes and cholesterol meds only)? Patient does not have SCP      Last Ov 3/27/2023  Last Labs 3/23/23

## 2023-06-26 DIAGNOSIS — G47.09 OTHER INSOMNIA: ICD-10-CM

## 2023-06-26 DIAGNOSIS — F41.9 ANXIETY: ICD-10-CM

## 2023-06-26 DIAGNOSIS — F31.9 BIPOLAR 1 DISORDER (HCC): ICD-10-CM

## 2023-06-26 NOTE — TELEPHONE ENCOUNTER
Received request via: Pharmacy    Was the patient seen in the last year in this department? Yes    Does the patient have an active prescription (recently filled or refills available) for medication(s) requested? No    Does the patient have [facility] Plus and need 100 day supply (blood pressure, diabetes and cholesterol meds only)? Patient does not have SCP      Last Ov 3/27/23  Last Labs 3/26/23

## 2023-06-27 RX ORDER — QUETIAPINE FUMARATE 50 MG/1
TABLET, FILM COATED ORAL
Qty: 60 TABLET | Refills: 1 | Status: SHIPPED | OUTPATIENT
Start: 2023-06-27 | End: 2023-10-18

## 2023-11-17 DIAGNOSIS — F41.9 ANXIETY: ICD-10-CM

## 2023-11-17 NOTE — TELEPHONE ENCOUNTER
Received request via: Patient    Was the patient seen in the last year in this department? Yes    Does the patient have an active prescription (recently filled or refills available) for medication(s) requested? No    Does the patient have group home Plus and need 100 day supply (blood pressure, diabetes and cholesterol meds only)? Patient does not have SCP      Last Ov 3/7/23  Last Labs 3/23/23

## 2023-11-20 RX ORDER — BUSPIRONE HYDROCHLORIDE 10 MG/1
TABLET ORAL
Qty: 90 TABLET | Refills: 0 | Status: SHIPPED | OUTPATIENT
Start: 2023-11-20 | End: 2023-12-18

## 2023-12-13 DIAGNOSIS — E78.2 MIXED HYPERLIPIDEMIA: ICD-10-CM

## 2023-12-13 DIAGNOSIS — M43.17 SPONDYLOLISTHESIS AT L5-S1 LEVEL: ICD-10-CM

## 2023-12-14 RX ORDER — ROSUVASTATIN CALCIUM 10 MG/1
10 TABLET, COATED ORAL EVERY EVENING
Qty: 90 TABLET | Refills: 0 | Status: SHIPPED | OUTPATIENT
Start: 2023-12-14 | End: 2023-12-18 | Stop reason: SDUPTHER

## 2023-12-14 RX ORDER — GABAPENTIN 400 MG/1
400 CAPSULE ORAL 3 TIMES DAILY
Qty: 270 CAPSULE | Refills: 0 | OUTPATIENT
Start: 2023-12-14

## 2023-12-14 NOTE — TELEPHONE ENCOUNTER
Was the patient seen in the last year in this department? Yes    Does patient have an active prescription for medications requested? Yes    Received Request Via: Pharmacy    Orders Only on 03/23/2023   Component Date Value    Thyroglob Ab 03/23/2023 <1.0     Complement Total Hemolyt* 03/23/2023 >60    Hospital Outpatient Visit on 03/15/2023   Component Date Value    TSH 03/15/2023 1.640     Free T-4 03/15/2023 0.91 (L)     Anti-Thyroglobulin 03/15/2023 <0.9     Microsomal -Tpo- Abs 03/15/2023 15.0 (H)     Myeloperoxidase Ab 03/15/2023 1     Serine Proteinase 3, IgG 03/15/2023 0     Leonard Antibodies 03/15/2023 0     Color 03/15/2023 Yellow     Character 03/15/2023 Cloudy (A)     Specific Gravity 03/15/2023 1.012     Ph 03/15/2023 8.0     Glucose 03/15/2023 Negative     Ketones 03/15/2023 Negative     Protein 03/15/2023 Negative     Bilirubin 03/15/2023 Negative     Urobilinogen, Urine 03/15/2023 0.2     Nitrite 03/15/2023 Negative     Leukocyte Esterase 03/15/2023 Trace (A)     Occult Blood 03/15/2023 Negative     Micro Urine Req 03/15/2023 Microscopic     Rheumatoid Factor -Neph- 03/15/2023 <10     C Reactive Protein High * 03/15/2023 0.2     Sed Rate Westergren 03/15/2023 4     Antinuclear Antibody 03/15/2023 Detected (A)     Ccp Antibodies 03/15/2023 3     Anti-Dna -Ds 03/15/2023 SEE BELOW     WBC 03/15/2023 10-20 (A)     RBC 03/15/2023 0-2     Bacteria 03/15/2023 Many (A)     Epithelial Cells 03/15/2023 Many (A)     Hyaline Cast 03/15/2023 0-2     Significant Indicator 03/15/2023 NEG     Source 03/15/2023 UR     Site 03/15/2023 -     Culture Result 03/15/2023 Usual urogenital beverly ,000 cfu/mL     Antinuclear Antibody (AN* 03/15/2023 Detected (H)     JEROME Interpretive Comment 03/15/2023 See Note     JEROME Pattern 03/15/2023 Speckled (A)     JEROME Titer 03/15/2023 1:320 (A)     Leonard/RNP IgG (MEENA) 03/15/2023 2     Zenobia-1 Antibody, IgG 03/15/2023 8     Anti-Scl-70 03/15/2023 1     Sjogren'S Anti-Ss-B 03/15/2023 8      SSA 52 (R0)(MEENA) Ab, IgG 03/15/2023 2     SSA 60 (R0)(MEENA) Ab, IgG 03/15/2023 22    ]

## 2023-12-18 ENCOUNTER — OFFICE VISIT (OUTPATIENT)
Dept: MEDICAL GROUP | Facility: CLINIC | Age: 38
End: 2023-12-18
Payer: COMMERCIAL

## 2023-12-18 VITALS
WEIGHT: 133.82 LBS | DIASTOLIC BLOOD PRESSURE: 70 MMHG | HEART RATE: 70 BPM | HEIGHT: 61 IN | OXYGEN SATURATION: 98 % | TEMPERATURE: 98.6 F | SYSTOLIC BLOOD PRESSURE: 98 MMHG | RESPIRATION RATE: 16 BRPM | BODY MASS INDEX: 25.27 KG/M2

## 2023-12-18 DIAGNOSIS — F31.9 BIPOLAR 1 DISORDER (HCC): ICD-10-CM

## 2023-12-18 DIAGNOSIS — E55.9 VITAMIN D DEFICIENCY: ICD-10-CM

## 2023-12-18 DIAGNOSIS — M43.17 SPONDYLOLISTHESIS AT L5-S1 LEVEL: ICD-10-CM

## 2023-12-18 DIAGNOSIS — E78.2 MIXED HYPERLIPIDEMIA: ICD-10-CM

## 2023-12-18 DIAGNOSIS — G47.09 OTHER INSOMNIA: ICD-10-CM

## 2023-12-18 DIAGNOSIS — F41.9 ANXIETY: ICD-10-CM

## 2023-12-18 PROCEDURE — 3074F SYST BP LT 130 MM HG: CPT | Performed by: PHYSICIAN ASSISTANT

## 2023-12-18 PROCEDURE — 99214 OFFICE O/P EST MOD 30 MIN: CPT | Performed by: PHYSICIAN ASSISTANT

## 2023-12-18 PROCEDURE — 3078F DIAST BP <80 MM HG: CPT | Performed by: PHYSICIAN ASSISTANT

## 2023-12-18 RX ORDER — GABAPENTIN 400 MG/1
400 CAPSULE ORAL 3 TIMES DAILY
Qty: 270 CAPSULE | Refills: 1 | Status: SHIPPED | OUTPATIENT
Start: 2023-12-18

## 2023-12-18 RX ORDER — BUSPIRONE HYDROCHLORIDE 15 MG/1
15 TABLET ORAL 3 TIMES DAILY
Qty: 270 TABLET | Refills: 1 | Status: SHIPPED | OUTPATIENT
Start: 2023-12-18

## 2023-12-18 RX ORDER — QUETIAPINE FUMARATE 50 MG/1
100 TABLET, FILM COATED ORAL NIGHTLY
Qty: 180 TABLET | Refills: 1 | Status: SHIPPED | OUTPATIENT
Start: 2023-12-18

## 2023-12-18 RX ORDER — BUPROPION HYDROCHLORIDE 150 MG/1
150 TABLET ORAL EVERY MORNING
Qty: 90 TABLET | Refills: 2 | Status: SHIPPED | OUTPATIENT
Start: 2023-12-18

## 2023-12-18 RX ORDER — ROSUVASTATIN CALCIUM 10 MG/1
10 TABLET, COATED ORAL EVERY EVENING
Qty: 90 TABLET | Refills: 2 | Status: SHIPPED | OUTPATIENT
Start: 2023-12-18

## 2023-12-19 NOTE — PROGRESS NOTES
"cc:  medication    Subjective:     Lisa Gomes is a 38 y.o. female presenting for medication      Patient presents to the office for medication.  Patient has had some increased anxiety.  She has recently lost her  due to illness.  This has been an adjustment for her.  She is requesting that we increase the anxiety medication at this time as she is having more frequent episodes.  She is also requesting refills for her bipolar disorder and her spondylolisthesis.  She also takes Seroquel for insomnia and is taking Crestor for mixed hyperlipidemia.  She is due for routine lab work as it has been over a year since she has had labs for cholesterol drawn.  She is also due for a vitamin D level.  Overall she is doing as best as can be expected.  It is still a struggle.  But she is learning to adapt to her new situation.    Review of systems:  See above.   Denies any symptoms unless previously indicated.        Current Outpatient Medications:     busPIRone (BUSPAR) 15 MG tablet, Take 1 Tablet by mouth 3 times a day., Disp: 270 Tablet, Rfl: 1    rosuvastatin (CRESTOR) 10 MG Tab, Take 1 Tablet by mouth every evening., Disp: 90 Tablet, Rfl: 2    buPROPion (WELLBUTRIN XL) 150 MG XL tablet, Take 1 Tablet by mouth every morning., Disp: 90 Tablet, Rfl: 2    gabapentin (NEURONTIN) 400 MG Cap, Take 1 Capsule by mouth 3 times a day., Disp: 270 Capsule, Rfl: 1    QUEtiapine (SEROQUEL) 50 MG tablet, Take 2 Tablets by mouth every evening., Disp: 180 Tablet, Rfl: 1    Allergies, past medical history, past surgical history, family history, social history reviewed and updated    Objective:     Vitals: BP 98/70 (BP Location: Right arm, Patient Position: Sitting, BP Cuff Size: Adult)   Pulse 70   Temp 37 °C (98.6 °F) (Temporal)   Resp 16   Ht 1.549 m (5' 1\")   Wt 60.7 kg (133 lb 13.1 oz)   LMP 12/16/2023 (Exact Date)   SpO2 98%   BMI 25.28 kg/m²   General: Alert, pleasant, NAD  EYES:   PERRL, EOMI, no icterus or pallor.  " Conjunctivae and lids normal.   HENT:  Normocephalic.  External ears normal.   Neck supple.     Respiratory: Normal respiratory effort.   Abdomen: Not obese  Skin: Warm, dry, no rashes.  Musculoskeletal: Gait is normal.  Moves all extremities well.    Extremities: normal range of motion all extremtieis.   Neurological: No tremors, sensation grossly intact,  CN2-12 intact.  Psych:  Affect/mood is normal, judgement is good, memory is intact, grooming is appropriate.    Assessment/Plan:     Lisa was seen today for medication refill.    Diagnoses and all orders for this visit:    Anxiety  -     busPIRone (BUSPAR) 15 MG tablet; Take 1 Tablet by mouth 3 times a day.  -     buPROPion (WELLBUTRIN XL) 150 MG XL tablet; Take 1 Tablet by mouth every morning.  -     QUEtiapine (SEROQUEL) 50 MG tablet; Take 2 Tablets by mouth every evening.    Continue with the current Seroquel dose as well as the Wellbutrin dose.  She would like to increase the BuSpar from 10 mg to 15 mg and would like to increase from twice a day to 3 times a day.  I do believe that this is a reasonable adjustment for patient.  Will follow-up in 4 to 6 weeks for reevaluation with med adjustment.    Bipolar 1 disorder (HCC)  -     QUEtiapine (SEROQUEL) 50 MG tablet; Take 2 Tablets by mouth every evening.  Spondylolisthesis at L5-S1 level  -     gabapentin (NEURONTIN) 400 MG Cap; Take 1 Capsule by mouth 3 times a day.  Other insomnia  -     QUEtiapine (SEROQUEL) 50 MG tablet; Take 2 Tablets by mouth every evening.    Medications refilled.    Mixed hyperlipidemia  -     Lipid Profile; Future  -     Comp Metabolic Panel; Future  -     rosuvastatin (CRESTOR) 10 MG Tab; Take 1 Tablet by mouth every evening.  Vitamin D deficiency  -     VITAMIN D,25 HYDROXY (DEFICIENCY); Future      I did refill patient's cholesterol medication.  However, we will obtain labs to evaluate further.      No follow-ups on file.    Please note that this dictation was created using voice  recognition software. I have made every reasonable attempt to correct obvious errors, but expect that there are errors of grammar and possible content that I did not discover before finalizing note.

## 2024-03-25 ENCOUNTER — APPOINTMENT (OUTPATIENT)
Dept: MEDICAL GROUP | Facility: CLINIC | Age: 39
End: 2024-03-25
Payer: COMMERCIAL

## 2024-05-02 ENCOUNTER — APPOINTMENT (OUTPATIENT)
Dept: MEDICAL GROUP | Facility: CLINIC | Age: 39
End: 2024-05-02
Payer: COMMERCIAL

## 2024-05-29 ENCOUNTER — NON-PROVIDER VISIT (OUTPATIENT)
Dept: MEDICAL GROUP | Facility: CLINIC | Age: 39
End: 2024-05-29
Payer: COMMERCIAL

## 2024-05-29 ENCOUNTER — HOSPITAL ENCOUNTER (OUTPATIENT)
Facility: MEDICAL CENTER | Age: 39
End: 2024-05-29
Attending: PHYSICIAN ASSISTANT
Payer: COMMERCIAL

## 2024-05-29 DIAGNOSIS — E55.9 VITAMIN D DEFICIENCY: ICD-10-CM

## 2024-05-29 DIAGNOSIS — E78.2 MIXED HYPERLIPIDEMIA: ICD-10-CM

## 2024-05-29 LAB
25(OH)D3 SERPL-MCNC: 56 NG/ML (ref 30–100)
ALBUMIN SERPL BCP-MCNC: 4.6 G/DL (ref 3.2–4.9)
ALBUMIN/GLOB SERPL: 1.7 G/DL
ALP SERPL-CCNC: 61 U/L (ref 30–99)
ALT SERPL-CCNC: 33 U/L (ref 2–50)
ANION GAP SERPL CALC-SCNC: 13 MMOL/L (ref 7–16)
AST SERPL-CCNC: 27 U/L (ref 12–45)
BILIRUB SERPL-MCNC: 0.5 MG/DL (ref 0.1–1.5)
BUN SERPL-MCNC: 17 MG/DL (ref 8–22)
CALCIUM ALBUM COR SERPL-MCNC: 8.8 MG/DL (ref 8.5–10.5)
CALCIUM SERPL-MCNC: 9.3 MG/DL (ref 8.5–10.5)
CHLORIDE SERPL-SCNC: 104 MMOL/L (ref 96–112)
CHOLEST SERPL-MCNC: 166 MG/DL (ref 100–199)
CO2 SERPL-SCNC: 23 MMOL/L (ref 20–33)
CREAT SERPL-MCNC: 0.8 MG/DL (ref 0.5–1.4)
GFR SERPLBLD CREATININE-BSD FMLA CKD-EPI: 96 ML/MIN/1.73 M 2
GLOBULIN SER CALC-MCNC: 2.7 G/DL (ref 1.9–3.5)
GLUCOSE SERPL-MCNC: 95 MG/DL (ref 65–99)
HDLC SERPL-MCNC: 59 MG/DL
LDLC SERPL CALC-MCNC: 88 MG/DL
POTASSIUM SERPL-SCNC: 4.1 MMOL/L (ref 3.6–5.5)
PROT SERPL-MCNC: 7.3 G/DL (ref 6–8.2)
SODIUM SERPL-SCNC: 140 MMOL/L (ref 135–145)
TRIGL SERPL-MCNC: 97 MG/DL (ref 0–149)

## 2024-05-29 NOTE — PROGRESS NOTES
Lisa Gomes is a 38 y.o. female here for a non-provider visit for a lab draw on 5/29/2024 at 7:59 AM.    Procedure performed:  Venipuncture     Anatomical site:  Left Antecubital Area    Equipment used:  21 g vacutainer     Labs drawn:  Comp Metabolic Panel , Lipid Profile, and Vitamin D     Ordering provider:  Eryn Segura PA-C    Lab draw completed by:  [facility]

## 2024-05-30 ENCOUNTER — APPOINTMENT (OUTPATIENT)
Dept: MEDICAL GROUP | Facility: CLINIC | Age: 39
End: 2024-05-30
Payer: COMMERCIAL

## 2024-05-30 VITALS
OXYGEN SATURATION: 98 % | DIASTOLIC BLOOD PRESSURE: 72 MMHG | HEIGHT: 62 IN | WEIGHT: 133.16 LBS | TEMPERATURE: 98.7 F | BODY MASS INDEX: 24.5 KG/M2 | SYSTOLIC BLOOD PRESSURE: 110 MMHG | RESPIRATION RATE: 18 BRPM | HEART RATE: 69 BPM

## 2024-05-30 DIAGNOSIS — F41.9 ANXIETY: ICD-10-CM

## 2024-05-30 DIAGNOSIS — M43.17 SPONDYLOLISTHESIS AT L5-S1 LEVEL: ICD-10-CM

## 2024-05-30 DIAGNOSIS — R53.83 OTHER FATIGUE: ICD-10-CM

## 2024-05-30 DIAGNOSIS — E78.2 MIXED HYPERLIPIDEMIA: ICD-10-CM

## 2024-05-30 RX ORDER — BUPROPION HYDROCHLORIDE 150 MG/1
150 TABLET ORAL EVERY MORNING
Qty: 90 TABLET | Refills: 2 | Status: SHIPPED | OUTPATIENT
Start: 2024-05-30

## 2024-05-30 RX ORDER — BUSPIRONE HYDROCHLORIDE 15 MG/1
15 TABLET ORAL 3 TIMES DAILY
Qty: 270 TABLET | Refills: 2 | Status: SHIPPED | OUTPATIENT
Start: 2024-05-30

## 2024-05-30 RX ORDER — ROSUVASTATIN CALCIUM 10 MG/1
10 TABLET, COATED ORAL EVERY EVENING
Qty: 90 TABLET | Refills: 2 | Status: SHIPPED | OUTPATIENT
Start: 2024-05-30

## 2024-05-30 RX ORDER — GABAPENTIN 400 MG/1
400 CAPSULE ORAL 3 TIMES DAILY
Qty: 270 CAPSULE | Refills: 1 | Status: SHIPPED | OUTPATIENT
Start: 2024-05-30

## 2024-05-30 NOTE — PROGRESS NOTES
"cc:  hyperlipidemia    Subjective:     Lisa Gomes is a 38 y.o. female presenting for hyperlipidemia        History of Present Illness  The patient is a 38-year-old female who is here today to follow up with labs. She does have hyperlipidemia, vitamin D deficiency, and anxiety.    The patient requires a refill of her Wellbutrin prescription.    The patient reports experiencing fatigue.       Review of systems:  See above.   Denies any symptoms unless previously indicated.        Current Outpatient Medications:     busPIRone (BUSPAR) 15 MG tablet, Take 1 Tablet by mouth 3 times a day., Disp: 270 Tablet, Rfl: 1    rosuvastatin (CRESTOR) 10 MG Tab, Take 1 Tablet by mouth every evening., Disp: 90 Tablet, Rfl: 2    buPROPion (WELLBUTRIN XL) 150 MG XL tablet, Take 1 Tablet by mouth every morning., Disp: 90 Tablet, Rfl: 2    gabapentin (NEURONTIN) 400 MG Cap, Take 1 Capsule by mouth 3 times a day., Disp: 270 Capsule, Rfl: 1    QUEtiapine (SEROQUEL) 50 MG tablet, Take 2 Tablets by mouth every evening., Disp: 180 Tablet, Rfl: 1    Allergies, past medical history, past surgical history, family history, social history reviewed and updated    Objective:     Vitals: /72 (BP Location: Left arm, Patient Position: Sitting, BP Cuff Size: Adult)   Pulse 69   Temp 37.1 °C (98.7 °F) (Temporal)   Resp 18   Ht 1.575 m (5' 2\")   Wt 60.4 kg (133 lb 2.5 oz)   SpO2 98%   BMI 24.35 kg/m²   General: Alert, pleasant, NAD  EYES:   PERRL, EOMI, no icterus or pallor.  Conjunctivae and lids normal.   HENT:  Normocephalic.  External ears normal.  Neck supple.   Respiratory: Normal respiratory effort.   Abdomen: Not obese  Skin: Warm, dry, no rashes.  Musculoskeletal: Gait is normal.  Moves all extremities well.    Extremities: normal rangeo of motion all extremities   Neurological: No tremors, sensation grossly intact,  CN2-12 intact.  Psych:  Affect/mood is normal, judgement is good, memory is intact, grooming is " appropriate.    Results  Laboratory Studies  Kidney function is normal. Blood sugar is normal. Liver function is normal. Vitamin D levels are normal. Total cholesterol is 166, triglycerides are 97. Good cholesterol is 1 point higher. Bad cholesterol is stellar.      Latest Reference Range & Units 05/29/24 07:24   Sodium 135 - 145 mmol/L 140   Potassium 3.6 - 5.5 mmol/L 4.1   Chloride 96 - 112 mmol/L 104   Co2 20 - 33 mmol/L 23   Anion Gap 7.0 - 16.0  13.0   Glucose 65 - 99 mg/dL 95   Bun 8 - 22 mg/dL 17   Creatinine 0.50 - 1.40 mg/dL 0.80   GFR (CKD-EPI) >60 mL/min/1.73 m 2 96   Calcium 8.5 - 10.5 mg/dL 9.3   Correct Calcium 8.5 - 10.5 mg/dL 8.8   AST(SGOT) 12 - 45 U/L 27   ALT(SGPT) 2 - 50 U/L 33   Alkaline Phosphatase 30 - 99 U/L 61   Total Bilirubin 0.1 - 1.5 mg/dL 0.5   Albumin 3.2 - 4.9 g/dL 4.6   Total Protein 6.0 - 8.2 g/dL 7.3   Globulin 1.9 - 3.5 g/dL 2.7   A-G Ratio g/dL 1.7   Cholesterol,Tot 100 - 199 mg/dL 166   Triglycerides 0 - 149 mg/dL 97   HDL >=40 mg/dL 59   LDL <100 mg/dL 88   25-Hydroxy   Vitamin D 25 30 - 100 ng/mL 56       Assessment/Plan:     There are no diagnoses linked to this encounter.    Assessment & Plan  1. Mixed hyperlipidemia.  The patient's condition is stable. A repeat laboratory test will be conducted in 1 year. The current medication regimen will be refilled.    2. Anxiety.  Her condition is well-managed. Her medications will be refilled at this time.    3. Fatigue.  She reports experiencing some fatigue, however, she does not wish to investigate this at this time. Should her symptoms persist, a follow-up appointment will be scheduled in the next few months.    4. Spondylolisthesis at L5-S1 level.  She is advised to continue with gabapentin.    Follow-up  A follow-up appointment is scheduled for 6 months from now for this medication.    No follow-ups on file.    Please note that this dictation was created using voice recognition software. I have made every reasonable attempt to  correct obvious errors, but expect that there are errors of grammar and possible content that I did not discover before finalizing note.

## 2024-08-19 DIAGNOSIS — F31.9 BIPOLAR 1 DISORDER (HCC): ICD-10-CM

## 2024-08-19 DIAGNOSIS — G47.09 OTHER INSOMNIA: ICD-10-CM

## 2024-08-19 DIAGNOSIS — F41.9 ANXIETY: ICD-10-CM

## 2024-08-21 RX ORDER — QUETIAPINE FUMARATE 50 MG/1
TABLET, FILM COATED ORAL
Qty: 180 TABLET | Refills: 1 | Status: SHIPPED | OUTPATIENT
Start: 2024-08-21

## 2024-08-21 NOTE — TELEPHONE ENCOUNTER
Received request via: Patient    Was the patient seen in the last year in this department? Yes    Does the patient have an active prescription (recently filled or refills available) for medication(s) requested?  Yes    Pharmacy Name: [facility] in [place]    Does the patient have [facility] and need 100-day supply? (This applies to ALL medications) Patient does not have SCP

## 2025-03-01 DIAGNOSIS — E78.2 MIXED HYPERLIPIDEMIA: ICD-10-CM

## 2025-03-01 DIAGNOSIS — F41.9 ANXIETY: ICD-10-CM

## 2025-03-03 NOTE — TELEPHONE ENCOUNTER
Received request via: Patient    Was the patient seen in the last year in this department? Yes    Does the patient have an active prescription (recently filled or refills available) for medication(s) requested?  yes    Pharmacy Name: [facility] in [place]    Does the patient have [facility] Plus and need 100-day supply? (This applies to ALL medications) Patient does not have SCP

## 2025-03-04 RX ORDER — ROSUVASTATIN CALCIUM 10 MG/1
10 TABLET, COATED ORAL EVERY EVENING
Qty: 90 TABLET | Refills: 0 | Status: SHIPPED | OUTPATIENT
Start: 2025-03-04

## 2025-03-04 RX ORDER — BUPROPION HYDROCHLORIDE 150 MG/1
150 TABLET ORAL EVERY MORNING
Qty: 90 TABLET | Refills: 0 | Status: SHIPPED | OUTPATIENT
Start: 2025-03-04